# Patient Record
Sex: MALE | Race: WHITE | Employment: OTHER | URBAN - METROPOLITAN AREA
[De-identification: names, ages, dates, MRNs, and addresses within clinical notes are randomized per-mention and may not be internally consistent; named-entity substitution may affect disease eponyms.]

---

## 2018-01-15 PROBLEM — N17.9 ACUTE RENAL FAILURE (HCC): Status: ACTIVE | Noted: 2018-01-15

## 2018-01-23 ENCOUNTER — OFFICE VISIT (OUTPATIENT)
Dept: INTERNAL MEDICINE | Age: 63
End: 2018-01-23
Attending: STUDENT IN AN ORGANIZED HEALTH CARE EDUCATION/TRAINING PROGRAM

## 2018-01-23 VITALS
SYSTOLIC BLOOD PRESSURE: 160 MMHG | HEART RATE: 97 BPM | DIASTOLIC BLOOD PRESSURE: 83 MMHG | BODY MASS INDEX: 24.66 KG/M2 | WEIGHT: 152.8 LBS | OXYGEN SATURATION: 92 % | TEMPERATURE: 92 F | RESPIRATION RATE: 18 BRPM

## 2018-01-23 DIAGNOSIS — Z00.00 ENCOUNTER FOR WELLNESS EXAMINATION: ICD-10-CM

## 2018-01-23 DIAGNOSIS — N13.9 OBSTRUCTIVE UROPATHY: ICD-10-CM

## 2018-01-23 DIAGNOSIS — R03.0 ELEVATED BLOOD PRESSURE READING IN OFFICE WITHOUT DIAGNOSIS OF HYPERTENSION: Primary | ICD-10-CM

## 2018-01-23 LAB
ALBUMIN SERPL-MCNC: 3.6 G/DL (ref 3.4–5)
ALP BLD-CCNC: 66 U/L (ref 40–129)
ALT SERPL-CCNC: 23 U/L (ref 10–40)
ANION GAP SERPL CALCULATED.3IONS-SCNC: 12 MMOL/L (ref 3–16)
AST SERPL-CCNC: 18 U/L (ref 15–37)
BASOPHILS ABSOLUTE: 0.1 K/UL (ref 0–0.2)
BASOPHILS RELATIVE PERCENT: 0.5 %
BILIRUB SERPL-MCNC: 0.4 MG/DL (ref 0–1)
BILIRUBIN DIRECT: <0.2 MG/DL (ref 0–0.3)
BILIRUBIN, INDIRECT: NORMAL MG/DL (ref 0–1)
BUN BLDV-MCNC: 10 MG/DL (ref 7–20)
CALCIUM SERPL-MCNC: 8.9 MG/DL (ref 8.3–10.6)
CHLORIDE BLD-SCNC: 99 MMOL/L (ref 99–110)
CO2: 26 MMOL/L (ref 21–32)
CREAT SERPL-MCNC: 1.1 MG/DL (ref 0.8–1.3)
EOSINOPHILS ABSOLUTE: 0.3 K/UL (ref 0–0.6)
EOSINOPHILS RELATIVE PERCENT: 2.2 %
GFR AFRICAN AMERICAN: >60
GFR NON-AFRICAN AMERICAN: >60
GLUCOSE BLD-MCNC: 84 MG/DL (ref 70–99)
HCT VFR BLD CALC: 41.9 % (ref 40.5–52.5)
HEMOGLOBIN: 14.3 G/DL (ref 13.5–17.5)
HEPATITIS C ANTIBODY INTERPRETATION: NORMAL
LYMPHOCYTES ABSOLUTE: 1.4 K/UL (ref 1–5.1)
LYMPHOCYTES RELATIVE PERCENT: 11.6 %
MAGNESIUM: 1.9 MG/DL (ref 1.8–2.4)
MCH RBC QN AUTO: 31.8 PG (ref 26–34)
MCHC RBC AUTO-ENTMCNC: 34 G/DL (ref 31–36)
MCV RBC AUTO: 93.5 FL (ref 80–100)
MONOCYTES ABSOLUTE: 1.2 K/UL (ref 0–1.3)
MONOCYTES RELATIVE PERCENT: 9.7 %
NEUTROPHILS ABSOLUTE: 9.2 K/UL (ref 1.7–7.7)
NEUTROPHILS RELATIVE PERCENT: 76 %
PDW BLD-RTO: 12.4 % (ref 12.4–15.4)
PHOSPHORUS: 2 MG/DL (ref 2.5–4.9)
PLATELET # BLD: 338 K/UL (ref 135–450)
PMV BLD AUTO: 7.9 FL (ref 5–10.5)
POTASSIUM SERPL-SCNC: 3.6 MMOL/L (ref 3.5–5.1)
RBC # BLD: 4.48 M/UL (ref 4.2–5.9)
SODIUM BLD-SCNC: 137 MMOL/L (ref 136–145)
TOTAL PROTEIN: 7.6 G/DL (ref 6.4–8.2)
TSH SERPL DL<=0.05 MIU/L-ACNC: 18.56 UIU/ML (ref 0.27–4.2)
WBC # BLD: 12.1 K/UL (ref 4–11)

## 2018-01-23 NOTE — PROGRESS NOTES
Outpatient Clinic Visit Note    Patient: Ervin Groves  : 1955 (20 y.o.)  Date: 2018    CC: Follow up visit after discharge    HPI:    Ervin Groves is a 64 yo male with no significant medical history (did not seek medical attention for long time) who presented to [facility] for nausea, vomiting, abdominal pain, diarrhea. Patient did not have any urinary symptoms on admission. Patient was found to have obstructive uropathy with enlarged prostate on CT abdomen/pelvis. BUN/Cr was 133/40 and K was 7.7 on admission and patient underwent emergent dialysis. Patient improved significantly medically and clinically after dialysis. Nephrology and urology was consulted. Patient was started on flomax and butcher was placed. Bone scan was negative for mets. Patient today feels good and wants to establish PCP. Denies headache, nausea, fever, abdominal pain, dysuria, chest pain or SOB. Patient has an appointment with urology for the further work up for enlarged prostate and butcher. He is agreeable with preventive and screening measures today. Past Medical History:    No past medical history on file. Past Surgical History:  No past surgical history on file. Home Medications:  Has been reviewed and as documented. Allergies:    Review of patient's allergies indicates no known allergies. Family History:   No family history on file. ROS: A 10-organ Review Of Systems was obtained and otherwise unremarkable except as per HPI. Data: Old records have been reviewed electronically. PHYSICAL EXAM:  BP (!) 160/83 (Site: Right Arm, Position: Sitting, Cuff Size: Medium Adult)   Pulse 97   Temp 92 °F (33.3 °C) (Oral)   Resp 18   Wt 152 lb 12.8 oz (69.3 kg)   SpO2 92%   BMI 24.66 kg/m²   Physical Exam   Constitutional: He is oriented to person, place, and time. He appears well-developed and well-nourished. HENT:   Head: Normocephalic. Eyes: EOM are normal. Pupils are equal, round, and reactive to light.

## 2018-01-24 LAB
HIV AG/AB: NORMAL
HIV ANTIGEN: NORMAL
HIV-1 ANTIBODY: NORMAL
HIV-2 AB: NORMAL

## 2018-02-07 PROBLEM — R31.9 HEMATURIA: Status: ACTIVE | Noted: 2018-02-07

## 2018-02-09 PROBLEM — N13.8 BENIGN PROSTATIC HYPERPLASIA WITH URINARY OBSTRUCTION: Status: ACTIVE | Noted: 2018-02-09

## 2018-02-09 PROBLEM — N17.9 ACUTE RENAL FAILURE (HCC): Status: RESOLVED | Noted: 2018-01-15 | Resolved: 2018-02-09

## 2018-02-09 PROBLEM — N40.1 BENIGN PROSTATIC HYPERPLASIA WITH URINARY OBSTRUCTION: Status: ACTIVE | Noted: 2018-02-09

## 2018-02-09 PROBLEM — R31.9 HEMATURIA: Status: RESOLVED | Noted: 2018-02-07 | Resolved: 2018-02-09

## 2018-02-20 ENCOUNTER — OFFICE VISIT (OUTPATIENT)
Dept: INTERNAL MEDICINE | Age: 63
End: 2018-02-20
Attending: STUDENT IN AN ORGANIZED HEALTH CARE EDUCATION/TRAINING PROGRAM

## 2018-02-20 VITALS
DIASTOLIC BLOOD PRESSURE: 90 MMHG | HEART RATE: 80 BPM | SYSTOLIC BLOOD PRESSURE: 155 MMHG | TEMPERATURE: 98.6 F | RESPIRATION RATE: 20 BRPM | BODY MASS INDEX: 24.86 KG/M2 | WEIGHT: 154 LBS | OXYGEN SATURATION: 98 %

## 2018-02-20 DIAGNOSIS — Z12.11 SCREEN FOR COLON CANCER: Primary | ICD-10-CM

## 2018-02-20 NOTE — PROGRESS NOTES
Outpatient Clinic Visit Note    Patient: Pola Gardner  : 1955 (59 y.o.)  Date: 2018    CC: Follow up    HPI:    Pola Gardner is a 66 yo male with recent history of UTI and obstructive uropathy s/p TURP who visits the clinic for the follow up. Patient needed temporary emergent dialysis and butcher placement for uropathy then later he underwent TURB with blood clot evacuation on 2018. He had UTI with enterococcus at that time and it was treated with ampicillin and amoxicillin. Patient did well after procedure without complication was discharged 2 days after TURP. Patient has been doing well since. Today he does not complain of any urinary symptoms, fever, chill, or abdominal pain. Patient wants to establish PCP with [address] [place] [address]. He visited this clinic once in January for the follow up visit and he had initial screening works done. Past Medical History:    Past Medical History:   Diagnosis Date    BPH (benign prostatic hyperplasia)        Past Surgical History:  Past Surgical History:   Procedure Laterality Date    TURP  2018    cysto       Home Medications:  Has been reviewed and as documented. Allergies:    Review of patient's allergies indicates no known allergies. Family History:   No family history on file. ROS: A 10-organ Review Of Systems was obtained and otherwise unremarkable except as per HPI. Data: Old records have been reviewed electronically. PHYSICAL EXAM:  BP (!) 155/90   Pulse 80   Temp 98.6 °F (37 °C)   Resp 20   Wt 154 lb (69.9 kg)   SpO2 98%   BMI 24.86 kg/m²   Physical Exam   Constitutional: He is oriented to person, place, and time. He appears well-developed. HENT:   Head: Normocephalic and atraumatic. Eyes: Pupils are equal, round, and reactive to light. Neck: Normal range of motion. No JVD present. Cardiovascular: Normal rate and regular rhythm. No murmur heard.   Pulmonary/Chest: Effort normal and breath sounds normal. No

## 2018-02-20 NOTE — PROGRESS NOTES
Addendum: I was physically present with resident physician, Dr. Edgardo Cordoba on 2/20/2018 for the key aspects of the history taking and the physical examination and performed them myself independently. I directed the management plan as documented in the note by the resident physician with the following additions:    1. Screen for colon cancer  Will update  - Direct Screening Colonoscopy Referral    2. Elevated blood pressure reading in office without diagnosis of hypertension  Likely has HTN, will f/u in 2 weeks, then 1 month if meds need to be started. Will get labs for age appropriate risk stratification, TSH, CMP, CBC     3. Obstructive uropathy  Now with indwelling butcher     Open in clinic and f/u in 3-6 month    Jaimie Tirado M.D.   Internal Medicine Attending  Office: [phone]  2/20/2018, 1:48 PM

## 2018-02-20 NOTE — PROGRESS NOTES
[facility] NOTE      February 20, 2018  Ramon Frank    Chief Complaint:   Chief Complaint   Patient presents with    Follow-Up from Hospital       Constitutional: negative  Eyes: negative  Ears, nose, mouth, throat, and face: negative  Respiratory: negative  Cardiovascular: negative  Gastrointestinal: negative  Genitourinary: negative, BPH  Integument/breast: negative  Hematologic/lymphatic: negative  Musculoskeletal: negative  Neurological: negative  Diabetes: No    Pain Assessment:  Pain Present: no      Mobility/Safety/Self-Care:  Steady Gait: Yes  History of Falls: No   History of a Fall within the last 30 days No  Assistive Device: None  Poor Hygiene: No    Psychosocial:   Depression: No  If YES,    Does Patient express current thoughts of harming self or others?: No  Anxious: No  Insomnia: No  Inappropriate Behavior: No  Alcohol Abuse: no  Substance Abuse: no  Signs of Physical Abuse: No  Signs of Emotional Abuse: No    Educational:  Identify the learner who is being assessed for education:  patient                    Ability to Learn:  Exhibits ability to grasp concepts and respond to questions: Medium  Ready to Learn: Yes  calm   Preferred Method of Learning:  written  Barriers to Learning: Verbalizes interest  Special Considerations due to cultural, Taoism, spiritual beliefs:  No  Language:  English  :  No    Note:   States he has his prostate shaved.       1:10 PM 2/20/2018

## 2021-09-20 ENCOUNTER — APPOINTMENT (OUTPATIENT)
Dept: GENERAL RADIOLOGY | Age: 66
DRG: 871 | End: 2021-09-20

## 2021-09-20 ENCOUNTER — HOSPITAL ENCOUNTER (INPATIENT)
Age: 66
LOS: 9 days | Discharge: HOME HEALTH | DRG: 871 | End: 2021-09-29
Attending: EMERGENCY MEDICINE | Admitting: INTERNAL MEDICINE
Payer: MEDICARE

## 2021-09-20 DIAGNOSIS — U07.1 COVID-19: Primary | ICD-10-CM

## 2021-09-20 PROBLEM — J12.82 PNEUMONIA DUE TO COVID-19 VIRUS: Status: ACTIVE | Noted: 2021-09-20

## 2021-09-20 LAB
ALBUMIN SERPL-MCNC: 3.4 G/DL (ref 3.4–5)
ALP BLD-CCNC: 64 U/L (ref 40–129)
ALT SERPL-CCNC: 19 U/L (ref 10–40)
ANION GAP SERPL CALCULATED.3IONS-SCNC: 16 MMOL/L (ref 3–16)
AST SERPL-CCNC: 57 U/L (ref 15–37)
BASE EXCESS VENOUS: -1.6 MMOL/L (ref -2–3)
BASOPHILS ABSOLUTE: 0 K/UL (ref 0–0.2)
BASOPHILS RELATIVE PERCENT: 0.8 %
BILIRUB SERPL-MCNC: 1 MG/DL (ref 0–1)
BILIRUBIN DIRECT: 0.4 MG/DL (ref 0–0.3)
BILIRUBIN, INDIRECT: 0.6 MG/DL (ref 0–1)
BUN BLDV-MCNC: 20 MG/DL (ref 7–20)
CALCIUM SERPL-MCNC: 8.5 MG/DL (ref 8.3–10.6)
CARBOXYHEMOGLOBIN: 0.8 % (ref 0–1.5)
CHLORIDE BLD-SCNC: 96 MMOL/L (ref 99–110)
CO2: 17 MMOL/L (ref 21–32)
CREAT SERPL-MCNC: 1.2 MG/DL (ref 0.8–1.3)
EKG ATRIAL RATE: 103 BPM
EKG DIAGNOSIS: NORMAL
EKG P AXIS: 30 DEGREES
EKG P-R INTERVAL: 140 MS
EKG Q-T INTERVAL: 332 MS
EKG QRS DURATION: 86 MS
EKG QTC CALCULATION (BAZETT): 434 MS
EKG R AXIS: 23 DEGREES
EKG T AXIS: 9 DEGREES
EKG VENTRICULAR RATE: 103 BPM
EOSINOPHILS ABSOLUTE: 0 K/UL (ref 0–0.6)
EOSINOPHILS RELATIVE PERCENT: 0 %
GFR AFRICAN AMERICAN: >60
GFR NON-AFRICAN AMERICAN: >60
GLUCOSE BLD-MCNC: 101 MG/DL (ref 70–99)
GLUCOSE BLD-MCNC: 166 MG/DL (ref 70–99)
HCO3 VENOUS: 20.8 MMOL/L (ref 24–28)
HCT VFR BLD CALC: 43.7 % (ref 40.5–52.5)
HEMOGLOBIN, VEN, REDUCED: 12.9 %
HEMOGLOBIN: 15.1 G/DL (ref 13.5–17.5)
LIPASE: 111 U/L (ref 13–60)
LYMPHOCYTES ABSOLUTE: 0.5 K/UL (ref 1–5.1)
LYMPHOCYTES RELATIVE PERCENT: 10.7 %
MCH RBC QN AUTO: 32.1 PG (ref 26–34)
MCHC RBC AUTO-ENTMCNC: 34.6 G/DL (ref 31–36)
MCV RBC AUTO: 92.8 FL (ref 80–100)
METHEMOGLOBIN VENOUS: 0 % (ref 0–1.5)
MONOCYTES ABSOLUTE: 0.5 K/UL (ref 0–1.3)
MONOCYTES RELATIVE PERCENT: 10.5 %
NEUTROPHILS ABSOLUTE: 3.5 K/UL (ref 1.7–7.7)
NEUTROPHILS RELATIVE PERCENT: 78 %
O2 SAT, VEN: 87 %
PCO2, VEN: 28.9 MMHG (ref 41–51)
PDW BLD-RTO: 12.9 % (ref 12.4–15.4)
PERFORMED ON: ABNORMAL
PH VENOUS: 7.46 (ref 7.35–7.45)
PLATELET # BLD: 181 K/UL (ref 135–450)
PMV BLD AUTO: 8 FL (ref 5–10.5)
PO2, VEN: 48.3 MMHG (ref 25–40)
POTASSIUM REFLEX MAGNESIUM: 3.7 MMOL/L (ref 3.5–5.1)
RBC # BLD: 4.7 M/UL (ref 4.2–5.9)
SODIUM BLD-SCNC: 129 MMOL/L (ref 136–145)
TCO2 CALC VENOUS: 22 MMOL/L
TOTAL PROTEIN: 7.8 G/DL (ref 6.4–8.2)
WBC # BLD: 4.5 K/UL (ref 4–11)

## 2021-09-20 PROCEDURE — 82570 ASSAY OF URINE CREATININE: CPT

## 2021-09-20 PROCEDURE — 6370000000 HC RX 637 (ALT 250 FOR IP): Performed by: INTERNAL MEDICINE

## 2021-09-20 PROCEDURE — 82803 BLOOD GASES ANY COMBINATION: CPT

## 2021-09-20 PROCEDURE — U0005 INFEC AGEN DETEC AMPLI PROBE: HCPCS

## 2021-09-20 PROCEDURE — 99285 EMERGENCY DEPT VISIT HI MDM: CPT

## 2021-09-20 PROCEDURE — 80048 BASIC METABOLIC PNL TOTAL CA: CPT

## 2021-09-20 PROCEDURE — 84439 ASSAY OF FREE THYROXINE: CPT

## 2021-09-20 PROCEDURE — 2060000000 HC ICU INTERMEDIATE R&B

## 2021-09-20 PROCEDURE — 6360000002 HC RX W HCPCS: Performed by: INTERNAL MEDICINE

## 2021-09-20 PROCEDURE — 93005 ELECTROCARDIOGRAM TRACING: CPT | Performed by: EMERGENCY MEDICINE

## 2021-09-20 PROCEDURE — 71045 X-RAY EXAM CHEST 1 VIEW: CPT

## 2021-09-20 PROCEDURE — U0003 INFECTIOUS AGENT DETECTION BY NUCLEIC ACID (DNA OR RNA); SEVERE ACUTE RESPIRATORY SYNDROME CORONAVIRUS 2 (SARS-COV-2) (CORONAVIRUS DISEASE [COVID-19]), AMPLIFIED PROBE TECHNIQUE, MAKING USE OF HIGH THROUGHPUT TECHNOLOGIES AS DESCRIBED BY CMS-2020-01-R: HCPCS

## 2021-09-20 PROCEDURE — 80076 HEPATIC FUNCTION PANEL: CPT

## 2021-09-20 PROCEDURE — 85025 COMPLETE CBC W/AUTO DIFF WBC: CPT

## 2021-09-20 PROCEDURE — 85379 FIBRIN DEGRADATION QUANT: CPT

## 2021-09-20 PROCEDURE — 2580000003 HC RX 258: Performed by: INTERNAL MEDICINE

## 2021-09-20 PROCEDURE — XW033E5 INTRODUCTION OF REMDESIVIR ANTI-INFECTIVE INTO PERIPHERAL VEIN, PERCUTANEOUS APPROACH, NEW TECHNOLOGY GROUP 5: ICD-10-PCS | Performed by: INTERNAL MEDICINE

## 2021-09-20 PROCEDURE — 36415 COLL VENOUS BLD VENIPUNCTURE: CPT

## 2021-09-20 PROCEDURE — 83690 ASSAY OF LIPASE: CPT

## 2021-09-20 PROCEDURE — 86140 C-REACTIVE PROTEIN: CPT

## 2021-09-20 PROCEDURE — 87449 NOS EACH ORGANISM AG IA: CPT

## 2021-09-20 PROCEDURE — 6360000002 HC RX W HCPCS: Performed by: STUDENT IN AN ORGANIZED HEALTH CARE EDUCATION/TRAINING PROGRAM

## 2021-09-20 PROCEDURE — 2500000003 HC RX 250 WO HCPCS: Performed by: INTERNAL MEDICINE

## 2021-09-20 PROCEDURE — 84443 ASSAY THYROID STIM HORMONE: CPT

## 2021-09-20 PROCEDURE — 84145 PROCALCITONIN (PCT): CPT

## 2021-09-20 RX ORDER — LANOLIN ALCOHOL/MO/W.PET/CERES
100 CREAM (GRAM) TOPICAL DAILY
Status: DISCONTINUED | OUTPATIENT
Start: 2021-09-20 | End: 2021-09-20 | Stop reason: CLARIF

## 2021-09-20 RX ORDER — 0.9 % SODIUM CHLORIDE 0.9 %
30 INTRAVENOUS SOLUTION INTRAVENOUS PRN
Status: DISCONTINUED | OUTPATIENT
Start: 2021-09-20 | End: 2021-09-29 | Stop reason: HOSPADM

## 2021-09-20 RX ORDER — DEXAMETHASONE 4 MG/1
6 TABLET ORAL DAILY
Status: DISCONTINUED | OUTPATIENT
Start: 2021-09-20 | End: 2021-09-20

## 2021-09-20 RX ORDER — VITAMIN B COMPLEX
2000 TABLET ORAL DAILY
Status: DISCONTINUED | OUTPATIENT
Start: 2021-09-20 | End: 2021-09-29 | Stop reason: HOSPADM

## 2021-09-20 RX ORDER — SODIUM CHLORIDE 9 MG/ML
25 INJECTION, SOLUTION INTRAVENOUS PRN
Status: DISCONTINUED | OUTPATIENT
Start: 2021-09-20 | End: 2021-09-29 | Stop reason: HOSPADM

## 2021-09-20 RX ORDER — SODIUM CHLORIDE 0.9 % (FLUSH) 0.9 %
5-40 SYRINGE (ML) INJECTION PRN
Status: DISCONTINUED | OUTPATIENT
Start: 2021-09-20 | End: 2021-09-29 | Stop reason: HOSPADM

## 2021-09-20 RX ORDER — PANTOPRAZOLE SODIUM 40 MG/1
40 TABLET, DELAYED RELEASE ORAL
Status: DISCONTINUED | OUTPATIENT
Start: 2021-09-20 | End: 2021-09-29 | Stop reason: HOSPADM

## 2021-09-20 RX ORDER — DEXAMETHASONE 4 MG/1
6 TABLET ORAL ONCE
Status: COMPLETED | OUTPATIENT
Start: 2021-09-20 | End: 2021-09-20

## 2021-09-20 RX ORDER — GUAIFENESIN/DEXTROMETHORPHAN 100-10MG/5
5 SYRUP ORAL 4 TIMES DAILY
Status: DISCONTINUED | OUTPATIENT
Start: 2021-09-20 | End: 2021-09-29 | Stop reason: HOSPADM

## 2021-09-20 RX ORDER — ACETAMINOPHEN 325 MG/1
650 TABLET ORAL EVERY 6 HOURS PRN
Status: DISCONTINUED | OUTPATIENT
Start: 2021-09-20 | End: 2021-09-29 | Stop reason: HOSPADM

## 2021-09-20 RX ORDER — SODIUM CHLORIDE 0.9 % (FLUSH) 0.9 %
5-40 SYRINGE (ML) INJECTION EVERY 12 HOURS SCHEDULED
Status: DISCONTINUED | OUTPATIENT
Start: 2021-09-20 | End: 2021-09-29 | Stop reason: HOSPADM

## 2021-09-20 RX ORDER — DEXTROSE MONOHYDRATE 25 G/50ML
12.5 INJECTION, SOLUTION INTRAVENOUS PRN
Status: DISCONTINUED | OUTPATIENT
Start: 2021-09-20 | End: 2021-09-29 | Stop reason: HOSPADM

## 2021-09-20 RX ORDER — ACETAMINOPHEN 650 MG/1
650 SUPPOSITORY RECTAL EVERY 6 HOURS PRN
Status: DISCONTINUED | OUTPATIENT
Start: 2021-09-20 | End: 2021-09-29 | Stop reason: HOSPADM

## 2021-09-20 RX ORDER — MULTIVIT-MIN/FERROUS GLUCONATE 9 MG/15 ML
15 LIQUID (ML) ORAL DAILY
Status: DISCONTINUED | OUTPATIENT
Start: 2021-09-20 | End: 2021-09-29 | Stop reason: HOSPADM

## 2021-09-20 RX ORDER — POLYETHYLENE GLYCOL 3350 17 G/17G
17 POWDER, FOR SOLUTION ORAL DAILY PRN
Status: DISCONTINUED | OUTPATIENT
Start: 2021-09-20 | End: 2021-09-29 | Stop reason: HOSPADM

## 2021-09-20 RX ORDER — INSULIN LISPRO 100 [IU]/ML
0-6 INJECTION, SOLUTION INTRAVENOUS; SUBCUTANEOUS
Status: DISCONTINUED | OUTPATIENT
Start: 2021-09-21 | End: 2021-09-29 | Stop reason: HOSPADM

## 2021-09-20 RX ORDER — SODIUM CHLORIDE 9 MG/ML
INJECTION, SOLUTION INTRAVENOUS CONTINUOUS
Status: ACTIVE | OUTPATIENT
Start: 2021-09-20 | End: 2021-09-20

## 2021-09-20 RX ORDER — GAUZE BANDAGE 2" X 2"
100 BANDAGE TOPICAL DAILY
Status: DISCONTINUED | OUTPATIENT
Start: 2021-09-20 | End: 2021-09-29 | Stop reason: HOSPADM

## 2021-09-20 RX ORDER — INSULIN LISPRO 100 [IU]/ML
0-3 INJECTION, SOLUTION INTRAVENOUS; SUBCUTANEOUS NIGHTLY
Status: DISCONTINUED | OUTPATIENT
Start: 2021-09-20 | End: 2021-09-29 | Stop reason: HOSPADM

## 2021-09-20 RX ORDER — NICOTINE POLACRILEX 4 MG
15 LOZENGE BUCCAL PRN
Status: DISCONTINUED | OUTPATIENT
Start: 2021-09-20 | End: 2021-09-29 | Stop reason: HOSPADM

## 2021-09-20 RX ORDER — DEXTROSE MONOHYDRATE 50 MG/ML
100 INJECTION, SOLUTION INTRAVENOUS PRN
Status: DISCONTINUED | OUTPATIENT
Start: 2021-09-20 | End: 2021-09-29 | Stop reason: HOSPADM

## 2021-09-20 RX ORDER — ONDANSETRON 2 MG/ML
4 INJECTION INTRAMUSCULAR; INTRAVENOUS EVERY 6 HOURS PRN
Status: DISCONTINUED | OUTPATIENT
Start: 2021-09-20 | End: 2021-09-29 | Stop reason: HOSPADM

## 2021-09-20 RX ORDER — ONDANSETRON 4 MG/1
4 TABLET, ORALLY DISINTEGRATING ORAL EVERY 8 HOURS PRN
Status: DISCONTINUED | OUTPATIENT
Start: 2021-09-20 | End: 2021-09-29 | Stop reason: HOSPADM

## 2021-09-20 RX ADMIN — SODIUM CHLORIDE: 9 INJECTION, SOLUTION INTRAVENOUS at 15:06

## 2021-09-20 RX ADMIN — REMDESIVIR 200 MG: 100 INJECTION, POWDER, LYOPHILIZED, FOR SOLUTION INTRAVENOUS at 21:04

## 2021-09-20 RX ADMIN — Medication 100 MG: at 16:51

## 2021-09-20 RX ADMIN — INSULIN LISPRO 1 UNITS: 100 INJECTION, SOLUTION INTRAVENOUS; SUBCUTANEOUS at 21:50

## 2021-09-20 RX ADMIN — Medication 15 ML: at 16:31

## 2021-09-20 RX ADMIN — Medication 2000 UNITS: at 15:05

## 2021-09-20 RX ADMIN — ENOXAPARIN SODIUM 30 MG: 30 INJECTION SUBCUTANEOUS at 14:40

## 2021-09-20 RX ADMIN — DEXAMETHASONE 6 MG: 4 TABLET ORAL at 14:38

## 2021-09-20 RX ADMIN — ENOXAPARIN SODIUM 30 MG: 30 INJECTION SUBCUTANEOUS at 21:00

## 2021-09-20 RX ADMIN — DEXAMETHASONE SODIUM PHOSPHATE 20 MG: 4 INJECTION, SOLUTION INTRAMUSCULAR; INTRAVENOUS at 21:49

## 2021-09-20 RX ADMIN — GUAIFENESIN SYRUP AND DEXTROMETHORPHAN 5 ML: 100; 10 SYRUP ORAL at 15:05

## 2021-09-20 RX ADMIN — GUAIFENESIN SYRUP AND DEXTROMETHORPHAN 5 ML: 100; 10 SYRUP ORAL at 20:59

## 2021-09-20 RX ADMIN — DEXAMETHASONE 6 MG: 4 TABLET ORAL at 12:36

## 2021-09-20 ASSESSMENT — PAIN SCALES - GENERAL
PAINLEVEL_OUTOF10: 0

## 2021-09-20 NOTE — PROGRESS NOTES
4 Eyes Admission Assessment     I agree as the admission nurse that 2 RN's have performed a thorough Head to Toe Skin Assessment on the patient. ALL assessment sites listed below have been assessed on admission. Areas assessed by both nurses:   [x]   Head, Face, and Ears   [x]   Shoulders, Back, and Chest  [x]   Arms, Elbows, and Hands   [x]   Coccyx, Sacrum, and Ischium  [x]   Legs, Feet, and Heels        Does the Patient have Skin Breakdown?   No         Ry Prevention initiated:  No   Wound Care Orders initiated:  No      [facility] nurse consulted for Pressure Injury (Stage 3,4, Unstageable, DTI, NWPT, and Complex wounds) or Ry score 18 or lower:  No      Nurse 1 eSignature: Electronically signed by Chitra Barnes RN on 9/20/21 at 7:43 PM EDT    **SHARE this note so that the co-signing nurse is able to place an eSignature**    Nurse 2 eSignature: Electronically signed by Quinn Argueta RN on 9/20/21 at 8:19 PM EDT

## 2021-09-20 NOTE — PROGRESS NOTES
Clinical Pharmacy Progress Note  Medication History     Admit Date: 9/20/21    List of of current medications patient is taking is complete. Home Medication list in EPIC updated to reflect changes noted below. Source of information: Patient    Changes made to medication list:   Medications added:   · Daily multivitamin    Other notes:   · Confirmed by patient -- no other home medications       Complete Home Medication List:  Medication Sig    Multiple Vitamin (MULTIVITAMIN ADULT PO) Take 1 tablet by mouth daily     Please call with questions!     [address] [facility] [address]  9/20/2021 4:51 PM

## 2021-09-20 NOTE — CONSULTS
Remdesivir Initiation Note    This patient meets criteria for initiation of remdesivir based on the following:  · Proven COVID-19  · Moderate disease (Requiring supplemental O2)  · Acceptable hepatic function (ALT within 5 times ULN)    Exclusion Criteria:   Severe disease requiring invasive or non-invasive mechanical ventilation (includes HFNC & BiPAP)   Could consider use in patients requiring high flow if early on in the disease course (based on symptom duration)   Use of more than 1 vasopressor prior to remdesivir initiation   Already improving on supportive treatment and/or impending discharge   Patients in whom the clinical team think death is in the immediate short-term where remdesivir is unlikely to change the clinical outcome     Liver function tests will be monitored daily while on remdesivir. Please call with any questions.   Arun Black, PharmD, BCPS  Main pharmacy: [id number]  9/20/2021 5:51 PM

## 2021-09-20 NOTE — ED TRIAGE NOTES
Pt reports testing positive for covid on Saturday and having poor po intake since, no sob but decreased oxygen on ra. Improved with supplemental oxygen via nasal cannula.  Pt alert and oriented and denies pain at this time

## 2021-09-20 NOTE — PROGRESS NOTES
Pt arrived to unit at this time. Pt alert and oriented, able to make needs known. Pt free from distress.

## 2021-09-20 NOTE — ED PROVIDER NOTES
ED Attending Attestation Note     Date of evaluation: 9/20/2021    This patient was seen by the resident. I have seen and examined the patient, agree with the workup, evaluation, management and diagnosis. The care plan has been discussed. I have seen the EKG and agree with resident interpretation. My assessment reveals a 71-year-old male who presents with chief complaint of fatigue. .  Patient has known Covid, found to be hypoxic on room air to 84%. Placed on oxygen. Increased work of breathing on exam.  Otherwise nontoxic in appearance.        Carlyn Martin MD  09/20/21 [id number]

## 2021-09-20 NOTE — ED PROVIDER NOTES
[facility] RESIDENT NOTE       Date of evaluation: 9/20/2021    Chief Complaint     Fatigue (pt reports testing covid + at urgent care on saturday, poor po intake since, ra oxygen mid 80s )      MAURIZIO Cruz is a 60 y.o. male with PMHx of BPH who presents after testing positive for Covid on 9/18/2021 at urgent care. Patient has had symptoms since approximately 1 week ago. He has had significant fatigue and loss of appetite. Now has worsening shortness of breath. Some coughing but this is a less prominent feature of his illness. Denies objective fevers. Patient denies prior pulmonary diagnoses or baseline oxygen requirement. With the exception of above, the patient denies any aggravating or alleviating factors as well as any other associated signs or symptoms. Review of Systems     Pertinent positive and negative findings as documented in the HPI otherwise all other systems were reviewed and were negative. Past Medical, Surgical, Family, and Social History     He has a past medical history of BPH (benign prostatic hyperplasia). He has a past surgical history that includes TURP (02/07/2018). His family history is not on file. He reports that he has never smoked. He has never used smokeless tobacco. He reports that he does not drink alcohol and does not use drugs. Medications     Current Discharge Medication List      CONTINUE these medications which have NOT CHANGED    Details   Multiple Vitamin (MULTIVITAMIN ADULT PO) Take 1 tablet by mouth daily             Allergies     He has No Known Allergies. Physical Exam     INITIAL VITALS: BP: (!) 118/91, Temp: 98.3 °F (36.8 °C), Pulse: 106, Resp: 20, SpO2: (!) 84 %     General:   No acute distress. Nontoxic appearing. Eyes:  Pupils reactive. EOMI  Neck:  Supple, trachea midline. Pulmonary:   Non-labored breathing. Breath sounds coarse  Cardiac:  Regular rate and rhythm.    Abdomen: Soft. Non-tender. Non-distended. No guarding or rebound. Musculoskeletal:  No long bone deformity. Extremities:  No peripheral edema. LE symmetric in size. Neuro:  Alert. Moves all four extremities to command. No focal deficit      Diagnostic Results     EKG Interpretation    Interpreted by emergency department physician    Rhythm: sinus tachycardia  Rate: tachycardia  Axis: normal  Ectopy: none  Conduction: normal  ST Segments: normal  T Waves: normal  Q Waves: none    Clinical Impression: sinus tachycardia    Jovita Walters MD    RADIOLOGY:  XR CHEST PORTABLE   Final Result      Coarse bilateral basilar predominant consolidation is compatible with atypical/viral pneumonia in the proper clinical context. Elevated right hemidiaphragm. Normal cardiomediastinal silhouette.       CTA PULMONARY W CONTRAST    (Results Pending)   VL Extremity Venous Bilateral    (Results Pending)       LABS:   Results for orders placed or performed during the hospital encounter of 09/20/21   CBC Auto Differential   Result Value Ref Range    WBC 4.5 4.0 - 11.0 K/uL    RBC 4.70 4.20 - 5.90 M/uL    Hemoglobin 15.1 13.5 - 17.5 g/dL    Hematocrit 43.7 40.5 - 52.5 %    MCV 92.8 80.0 - 100.0 fL    MCH 32.1 26.0 - 34.0 pg    MCHC 34.6 31.0 - 36.0 g/dL    RDW 12.9 12.4 - 15.4 %    Platelets 281 464 - 304 K/uL    MPV 8.0 5.0 - 10.5 fL    Neutrophils % 78.0 %    Lymphocytes % 10.7 %    Monocytes % 10.5 %    Eosinophils % 0.0 %    Basophils % 0.8 %    Neutrophils Absolute 3.5 1.7 - 7.7 K/uL    Lymphocytes Absolute 0.5 (L) 1.0 - 5.1 K/uL    Monocytes Absolute 0.5 0.0 - 1.3 K/uL    Eosinophils Absolute 0.0 0.0 - 0.6 K/uL    Basophils Absolute 0.0 0.0 - 0.2 K/uL   Basic Metabolic Panel w/ Reflex to MG   Result Value Ref Range    Sodium 129 (L) 136 - 145 mmol/L    Potassium reflex Magnesium 3.7 3.5 - 5.1 mmol/L    Chloride 96 (L) 99 - 110 mmol/L    CO2 17 (L) 21 - 32 mmol/L    Anion Gap 16 3 - 16    Glucose 101 (H) 70 - 99 mg/dL    BUN 20 7 - 20 mg/dL    CREATININE 1.2 0.8 - 1.3 mg/dL    GFR Non-African American >60 >60    GFR African American >60 >60    Calcium 8.5 8.3 - 10.6 mg/dL   Hepatic Function Panel   Result Value Ref Range    Total Protein 7.8 6.4 - 8.2 g/dL    Albumin 3.4 3.4 - 5.0 g/dL    Alkaline Phosphatase 64 40 - 129 U/L    ALT 19 10 - 40 U/L    AST 57 (H) 15 - 37 U/L    Total Bilirubin 1.0 0.0 - 1.0 mg/dL    Bilirubin, Direct 0.4 (H) 0.0 - 0.3 mg/dL    Bilirubin, Indirect 0.6 0.0 - 1.0 mg/dL   Lipase   Result Value Ref Range    Lipase 111.0 (H) 13.0 - 60.0 U/L   Blood Gas, Venous   Result Value Ref Range    pH, Jacky 7.465 (H) 7.350 - 7.450    pCO2, Jacky 28.9 (L) 41.0 - 51.0 mmHg    pO2, Jacky 48.3 (H) 25 - 40 mmHg    HCO3, Venous 20.8 (L) 24.0 - 28.0 mmol/L    Base Excess, Jacky -1.6 -2.0 - 3.0 mmol/L    O2 Sat, Jacky 87 Not established %    Carboxyhemoglobin 0.8 0.0 - 1.5 %    MetHgb, Jacky 0.0 0.0 - 1.5 %    TC02 (Calc), Jacky 22 mmol/L    Hemoglobin, Jacky, Reduced 12.90 %   Creatinine, urine, random   Result Value Ref Range    Creatinine, Ur 225.0 39.0 - 259.0 mg/dL   Basic metabolic panel   Result Value Ref Range    Sodium 131 (L) 136 - 145 mmol/L    Potassium 3.7 3.5 - 5.1 mmol/L    Chloride 98 (L) 99 - 110 mmol/L    CO2 18 (L) 21 - 32 mmol/L    Anion Gap 15 3 - 16    Glucose 198 (H) 70 - 99 mg/dL    BUN 22 (H) 7 - 20 mg/dL    CREATININE 1.1 0.8 - 1.3 mg/dL    GFR Non-African American >60 >60    GFR African American >60 >60    Calcium 8.2 (L) 8.3 - 10.6 mg/dL   Procalcitonin   Result Value Ref Range    Procalcitonin 0.08 0.00 - 0.15 ng/mL   C-Reactive Protein   Result Value Ref Range    CRP 44.0 (H) 0.0 - 5.1 mg/L   D-Dimer, Quantitative   Result Value Ref Range    D-Dimer, Quant 1080 (H) 0 - 229 ng/mL DDU   Hepatic function panel   Result Value Ref Range    Total Protein 7.3 6.4 - 8.2 g/dL    Albumin 3.1 (L) 3.4 - 5.0 g/dL    Alkaline Phosphatase 63 40 - 129 U/L    ALT 20 10 - 40 U/L    AST 49 (H) 15 - 37 U/L    Total Bilirubin 0.8 0.0 - 1.0 followingmedications:  Orders Placed This Encounter   Medications    sodium chloride flush 0.9 % injection 5-40 mL    sodium chloride flush 0.9 % injection 5-40 mL    0.9 % sodium chloride infusion    enoxaparin (LOVENOX) injection 30 mg    OR Linked Order Group     ondansetron (ZOFRAN-ODT) disintegrating tablet 4 mg     ondansetron (ZOFRAN) injection 4 mg    polyethylene glycol (GLYCOLAX) packet 17 g    OR Linked Order Group     acetaminophen (TYLENOL) tablet 650 mg     acetaminophen (TYLENOL) suppository 650 mg    0.9 % sodium chloride infusion    guaiFENesin-dextromethorphan (ROBITUSSIN DM) 100-10 MG/5ML syrup 5 mL    DISCONTD: dexamethasone (DECADRON) tablet 6 mg    Vitamin D (CHOLECALCIFEROL) tablet 2,000 Units    CENTRUM/CERTA-ISSA with minerals oral solution 15 mL    DISCONTD: thiamine tablet 100 mg    dexamethasone (DECADRON) tablet 6 mg    thiamine mononitrate tablet 100 mg    dexamethasone (DECADRON) 20 mg in sodium chloride 0.9 % IVPB    FOLLOWED BY Linked Order Group     remdesivir 200 mg in sodium chloride 0.9 % 250 mL IVPB      Order Specific Question:   Antimicrobial Indications      Answer: Other      Order Specific Question:   Other Abx Indication      Answer:   covid     remdesivir 100 mg in sodium chloride 0.9 % 250 mL IVPB      Order Specific Question:   Antimicrobial Indications      Answer:    Other      Order Specific Question:   Other Abx Indication      Answer:   covid    0.9 % sodium chloride bolus    insulin lispro (1 Unit Dial) 0-6 Units    insulin lispro (1 Unit Dial) 0-3 Units    pantoprazole (PROTONIX) tablet 40 mg    glucose (GLUTOSE) 40 % oral gel 15 g    dextrose 50 % IV solution    glucagon (rDNA) injection 1 mg    dextrose 5 % solution    iopamidol (ISOVUE-370) 76 % injection 80 mL       CONSULTS:  IP CONSULT TO HOSPITALIST  IP CONSULT TO [address] [place] [address] / ASSESSMENT / Raymond Garsia is a 60 y.o. male with a history and presentation as described above in HPI. The patient was evaluated by myself and the ED Attending Physician, Dr. Latasha Benitez. All management and disposition plans were discussed and agreed upon. Upon presentation, the patient was mildly tachycardic and hypoxic at 84% on room air. Patient was placed on 3L nasal cannula with improvement in oxygen saturation to the mid 90s. He is able to speak in full sentences and has nonlabored respirations. A full history and physical examination was performed. Laboratory studies included basic metabolic panel with mild hyponatremia and metabolic acidosis. VBG with respiratory acidosis. CBC with leukopenia otherwise within normal limits. Mild AST elevation on liver function testing. Chest XR obtained with bilateral consolidation c/w viral PNA in setting of COVID+ test.    Given new oxygen requirement will provided patient with dexamethasone and admit to the hospital. He looks comfortable overall with stable O2 requirements in the ED, feel that floor status is appropriate at this time. At this time the patient has been admitted to medicine for further evaluation and management of acute hypoxic respiratory failure. The patient will continue to be monitored here in the emergency department until which time he is moved to his new treatment location. This patient was also evaluated by the attending physician. All care plans werediscussed and agreed upon.     Clinical Impression     1. COVID-19        Disposition     Admit        Jack Francois MD  Resident  09/21/21 [id number]

## 2021-09-20 NOTE — H&P
cooperative. HEENT Normal cephalic, atraumatic without obvious deformity. Conjunctivae/corneas clear. Neck: Supple, No jugular venous distention/bruits. Trachea midline without thyromegaly or adenopathy with full range of motion. Lungs: diffuse bileratel rales  Heart: Regular rate and rhythm with Normal S1/S2 without murmurs, rubs or gallops, point of maximum impulse non-displaced  Abdomen: Soft, non-tender or non-distended without rigidity or guarding and positive bowel sounds all four quadrants. Extremities: No clubbing, cyanosis, or edema bilaterally. Skin: Skin color, texture, turgor normal.  No rashes or lesions. Neurologic: Alert and oriented X 3,  grossly non-focal.  Mental status: Alert, oriented, thought content appropriate. Capillary refill is brisk  Peripheral pulses 2+    CXR:  I have reviewed the CXR with the following interpretation: multifocal pneumonia  EKG:  I have reviewed the EKG with the following interpretation: sinus with no acute ST TW changes    CBC   Recent Labs     09/20/21  1020   WBC 4.5   HGB 15.1   HCT 43.7         RENAL  Recent Labs     09/20/21  1020   *   K 3.7   CL 96*   CO2 17*   BUN 20   CREATININE 1.2     LFT'S  Recent Labs     09/20/21  1020   AST 57*   ALT 19   BILIDIR 0.4*   BILITOT 1.0   ALKPHOS 64     COAG  No results for input(s): INR in the last 72 hours. CARDIAC ENZYMES  No results for input(s): CKTOTAL, CKMB, CKMBINDEX, TROPONINI in the last 72 hours.     U/A:    Lab Results   Component Value Date    COLORU Not Entered 02/06/2018    WBCUA see below 02/06/2018    RBCUA >100 02/06/2018    BACTERIA 2+ 01/15/2018    CLARITYU Not Entered 02/06/2018    SPECGRAV <=1.005 02/06/2018    LEUKOCYTESUR LARGE 02/06/2018    BLOODU LARGE 02/06/2018    GLUCOSEU Negative 02/06/2018       ABG  No results found for: LVN3VNF, BEART, C3GNZHHY, PHART, THGBART, LBV3MIN, PO2ART, ZRA9FCW        Active Hospital Problems    Diagnosis Date Noted    Pneumonia due to COVID-19 virus [U07.1, J12.82] 09/20/2021         ASSESSMENT/PLAN:    COVID 19 pneumonia with hypoxia:  Admit for close monitoring  Obtain procal, legionella, strep AG, CRP, Ddimer  If Ddimer elevated- will get CTPA    Start decadron and consult pharmacy as patient in his first week of symptoms and may benefit from remdesivir    BPH:  Monitor for urinary retention    Hyponatremia:  Likely due to poor intake. Obtain urine osm, urine lytes and start on saline    DVT Prophylaxis: lovenox  Diet: ADULT DIET; Regular  Code Status: Full Code  PT/OT Eval Status: at baseline    Dispo - Bennie Vo MD    Thank you No primary care provider on file. for the opportunity to be involved in this patient's care. If you have any questions or concerns please feel free to contact me at [phone].

## 2021-09-21 ENCOUNTER — APPOINTMENT (OUTPATIENT)
Dept: CT IMAGING | Age: 66
DRG: 871 | End: 2021-09-21

## 2021-09-21 ENCOUNTER — APPOINTMENT (OUTPATIENT)
Dept: VASCULAR LAB | Age: 66
DRG: 871 | End: 2021-09-21

## 2021-09-21 LAB
ALBUMIN SERPL-MCNC: 3.1 G/DL (ref 3.4–5)
ALP BLD-CCNC: 63 U/L (ref 40–129)
ALT SERPL-CCNC: 20 U/L (ref 10–40)
ANION GAP SERPL CALCULATED.3IONS-SCNC: 15 MMOL/L (ref 3–16)
AST SERPL-CCNC: 49 U/L (ref 15–37)
BASOPHILS ABSOLUTE: 0 K/UL (ref 0–0.2)
BASOPHILS RELATIVE PERCENT: 0.4 %
BILIRUB SERPL-MCNC: 0.8 MG/DL (ref 0–1)
BILIRUBIN DIRECT: 0.3 MG/DL (ref 0–0.3)
BILIRUBIN, INDIRECT: 0.5 MG/DL (ref 0–1)
BUN BLDV-MCNC: 22 MG/DL (ref 7–20)
C-REACTIVE PROTEIN: 43 MG/L (ref 0–5.1)
C-REACTIVE PROTEIN: 44 MG/L (ref 0–5.1)
CALCIUM SERPL-MCNC: 8.2 MG/DL (ref 8.3–10.6)
CHLORIDE BLD-SCNC: 98 MMOL/L (ref 99–110)
CO2: 18 MMOL/L (ref 21–32)
CREAT SERPL-MCNC: 1.1 MG/DL (ref 0.8–1.3)
CREATININE URINE: 225 MG/DL (ref 39–259)
D DIMER: 1080 NG/ML DDU (ref 0–229)
D DIMER: 5150 NG/ML DDU (ref 0–229)
EOSINOPHILS ABSOLUTE: 0 K/UL (ref 0–0.6)
EOSINOPHILS RELATIVE PERCENT: 0 %
GFR AFRICAN AMERICAN: >60
GFR NON-AFRICAN AMERICAN: >60
GLUCOSE BLD-MCNC: 157 MG/DL (ref 70–99)
GLUCOSE BLD-MCNC: 158 MG/DL (ref 70–99)
GLUCOSE BLD-MCNC: 172 MG/DL (ref 70–99)
GLUCOSE BLD-MCNC: 172 MG/DL (ref 70–99)
GLUCOSE BLD-MCNC: 198 MG/DL (ref 70–99)
HCT VFR BLD CALC: 44.4 % (ref 40.5–52.5)
HEMOGLOBIN: 15.2 G/DL (ref 13.5–17.5)
L. PNEUMOPHILA SEROGP 1 UR AG: NORMAL
LACTIC ACID: 1.8 MMOL/L (ref 0.4–2)
LYMPHOCYTES ABSOLUTE: 0.4 K/UL (ref 1–5.1)
LYMPHOCYTES RELATIVE PERCENT: 13.8 %
MCH RBC QN AUTO: 31.8 PG (ref 26–34)
MCHC RBC AUTO-ENTMCNC: 34.3 G/DL (ref 31–36)
MCV RBC AUTO: 92.7 FL (ref 80–100)
MONOCYTES ABSOLUTE: 0.2 K/UL (ref 0–1.3)
MONOCYTES RELATIVE PERCENT: 7.3 %
NEUTROPHILS ABSOLUTE: 2.3 K/UL (ref 1.7–7.7)
NEUTROPHILS RELATIVE PERCENT: 78.5 %
PDW BLD-RTO: 13 % (ref 12.4–15.4)
PERFORMED ON: ABNORMAL
PLATELET # BLD: 192 K/UL (ref 135–450)
PMV BLD AUTO: 8.1 FL (ref 5–10.5)
POTASSIUM SERPL-SCNC: 3.7 MMOL/L (ref 3.5–5.1)
PROCALCITONIN: 0.08 NG/ML (ref 0–0.15)
RBC # BLD: 4.78 M/UL (ref 4.2–5.9)
SARS-COV-2: DETECTED
SODIUM BLD-SCNC: 131 MMOL/L (ref 136–145)
STREP PNEUMONIAE ANTIGEN, URINE: NORMAL
T4 FREE: 1.2 NG/DL (ref 0.9–1.8)
TOTAL PROTEIN: 7.3 G/DL (ref 6.4–8.2)
TSH REFLEX: 4.27 UIU/ML (ref 0.27–4.2)
WBC # BLD: 3 K/UL (ref 4–11)

## 2021-09-21 PROCEDURE — 36415 COLL VENOUS BLD VENIPUNCTURE: CPT

## 2021-09-21 PROCEDURE — 71275 CT ANGIOGRAPHY CHEST: CPT

## 2021-09-21 PROCEDURE — 94761 N-INVAS EAR/PLS OXIMETRY MLT: CPT

## 2021-09-21 PROCEDURE — 2500000003 HC RX 250 WO HCPCS: Performed by: INTERNAL MEDICINE

## 2021-09-21 PROCEDURE — 2700000000 HC OXYGEN THERAPY PER DAY

## 2021-09-21 PROCEDURE — 93970 EXTREMITY STUDY: CPT

## 2021-09-21 PROCEDURE — 6360000002 HC RX W HCPCS: Performed by: INTERNAL MEDICINE

## 2021-09-21 PROCEDURE — 6360000004 HC RX CONTRAST MEDICATION: Performed by: INTERNAL MEDICINE

## 2021-09-21 PROCEDURE — 80076 HEPATIC FUNCTION PANEL: CPT

## 2021-09-21 PROCEDURE — 83605 ASSAY OF LACTIC ACID: CPT

## 2021-09-21 PROCEDURE — 85379 FIBRIN DEGRADATION QUANT: CPT

## 2021-09-21 PROCEDURE — 85025 COMPLETE CBC W/AUTO DIFF WBC: CPT

## 2021-09-21 PROCEDURE — 6370000000 HC RX 637 (ALT 250 FOR IP): Performed by: INTERNAL MEDICINE

## 2021-09-21 PROCEDURE — 2060000000 HC ICU INTERMEDIATE R&B

## 2021-09-21 PROCEDURE — 2580000003 HC RX 258: Performed by: INTERNAL MEDICINE

## 2021-09-21 PROCEDURE — 86140 C-REACTIVE PROTEIN: CPT

## 2021-09-21 RX ADMIN — Medication 100 MG: at 07:56

## 2021-09-21 RX ADMIN — DEXAMETHASONE SODIUM PHOSPHATE 20 MG: 4 INJECTION, SOLUTION INTRAMUSCULAR; INTRAVENOUS at 22:22

## 2021-09-21 RX ADMIN — GUAIFENESIN SYRUP AND DEXTROMETHORPHAN 5 ML: 100; 10 SYRUP ORAL at 22:10

## 2021-09-21 RX ADMIN — Medication 10 ML: at 22:11

## 2021-09-21 RX ADMIN — GUAIFENESIN SYRUP AND DEXTROMETHORPHAN 5 ML: 100; 10 SYRUP ORAL at 18:22

## 2021-09-21 RX ADMIN — ENOXAPARIN SODIUM 30 MG: 30 INJECTION SUBCUTANEOUS at 07:56

## 2021-09-21 RX ADMIN — GUAIFENESIN SYRUP AND DEXTROMETHORPHAN 5 ML: 100; 10 SYRUP ORAL at 07:56

## 2021-09-21 RX ADMIN — REMDESIVIR 100 MG: 100 INJECTION, POWDER, LYOPHILIZED, FOR SOLUTION INTRAVENOUS at 22:21

## 2021-09-21 RX ADMIN — GUAIFENESIN SYRUP AND DEXTROMETHORPHAN 5 ML: 100; 10 SYRUP ORAL at 13:32

## 2021-09-21 RX ADMIN — PANTOPRAZOLE SODIUM 40 MG: 40 TABLET, DELAYED RELEASE ORAL at 06:30

## 2021-09-21 RX ADMIN — Medication 2000 UNITS: at 07:55

## 2021-09-21 RX ADMIN — SODIUM CHLORIDE 25 ML: 9 INJECTION, SOLUTION INTRAVENOUS at 22:21

## 2021-09-21 RX ADMIN — INSULIN LISPRO 1 UNITS: 100 INJECTION, SOLUTION INTRAVENOUS; SUBCUTANEOUS at 22:11

## 2021-09-21 RX ADMIN — IOPAMIDOL 80 ML: 755 INJECTION, SOLUTION INTRAVENOUS at 09:35

## 2021-09-21 RX ADMIN — BARICITINIB 4 MG: 2 TABLET, FILM COATED ORAL at 15:22

## 2021-09-21 RX ADMIN — ENOXAPARIN SODIUM 30 MG: 30 INJECTION SUBCUTANEOUS at 22:10

## 2021-09-21 RX ADMIN — INSULIN LISPRO 1 UNITS: 100 INJECTION, SOLUTION INTRAVENOUS; SUBCUTANEOUS at 13:33

## 2021-09-21 RX ADMIN — INSULIN LISPRO 1 UNITS: 100 INJECTION, SOLUTION INTRAVENOUS; SUBCUTANEOUS at 18:22

## 2021-09-21 RX ADMIN — SODIUM CHLORIDE 30 ML: 9 INJECTION, SOLUTION INTRAVENOUS at 22:20

## 2021-09-21 RX ADMIN — INSULIN LISPRO 1 UNITS: 100 INJECTION, SOLUTION INTRAVENOUS; SUBCUTANEOUS at 10:18

## 2021-09-21 ASSESSMENT — PAIN SCALES - GENERAL
PAINLEVEL_OUTOF10: 0

## 2021-09-21 NOTE — PROGRESS NOTES
Hospitalist Progress Note      PCP: No primary care provider on file. Date of Admission: 2021    Chief Complaint on Admission: shortness of breath    Pt Seen/Examined and Chart Reviewed. Admitting dx COVID 19    SUBJECTIVE/OBJECTIVE:   Patient is admitted with COVID 19 at the end of first week since symptoms onset. Today patient requires higher level of O2 support. He was on 3 liters in ER and right now with borderline sats on 8 liters. Overall feels about the same. No chest pain, no nausea, emesis, diarrhea. Patient denies need to update family. Encouraged the patient to determine his emergency contact. Allergies  Patient has no known allergies. Medications      Scheduled Meds:   baricitinib  4 mg Oral Daily    sodium chloride flush  5-40 mL IntraVENous 2 times per day    enoxaparin  30 mg SubCUTAneous BID    guaiFENesin-dextromethorphan  5 mL Oral 4x Daily    Vitamin D  2,000 Units Oral Daily    CENTRUM/CERTA-ISSA with minerals oral  15 mL Oral Daily    thiamine mononitrate  100 mg Oral Daily    dexamethasone  20 mg IntraVENous Q24H    remdesivir IVPB  100 mg IntraVENous Q24H    insulin lispro  0-6 Units SubCUTAneous TID WC    insulin lispro  0-3 Units SubCUTAneous Nightly    pantoprazole  40 mg Oral QAM AC       Infusions:   sodium chloride      dextrose         PRN Meds:  sodium chloride flush, sodium chloride, ondansetron **OR** ondansetron, polyethylene glycol, acetaminophen **OR** acetaminophen, sodium chloride, glucose, dextrose, glucagon (rDNA), dextrose    Vitals    TEMPERATURE:  Current - Temp: 97.4 °F (36.3 °C);  Max - Temp  Av.5 °F (36.4 °C)  Min: 96 °F (35.6 °C)  Max: 98.1 °F (36.7 °C)  RESPIRATIONS RANGE: Resp  Av.3  Min: 17  Max: 22  PULSE RANGE: Pulse  Av.6  Min: 71  Max: 108  BLOOD PRESSURE RANGE:  Systolic (18OYA), OCF:585 , Min:103 , TUW:806   ; Diastolic (61HOL), FOT:37, Min:78, Max:92    PULSE OXIMETRY RANGE: SpO2  Av.9 %  Min: 89 % Max: 96 %  24HR INTAKE/OUTPUT:      Intake/Output Summary (Last 24 hours) at 9/21/2021 1458  Last data filed at 9/21/2021 1331  Gross per 24 hour   Intake 1250 ml   Output 675 ml   Net 575 ml       Exam:      General appearance: No apparent distress, appears stated age and cooperative. Lungs: bilateral rales present  Heart: tachy, regular rate and rhythm with Normal S1/S2 without  murmurs, rubs or gallops, point of maximum impulse non-displaced  Abdomen: Soft, non-tender or non-distended without rigidity or guarding and positive bowel sounds all four quadrants. Extremities: No clubbing, cyanosis, or edema bilaterally. Skin: Skin color, texture, turgor normal.    Neurologic: Alert and oriented X 3,  , grossly non-focal.  Mental status: Alert, oriented, thought content appropriate. Data    Recent Labs     09/20/21  1020 09/21/21  0450   WBC 4.5 3.0*   HGB 15.1 15.2   HCT 43.7 44.4    192      Recent Labs     09/20/21  1020 09/20/21  2320   * 131*   K 3.7 3.7   CL 96* 98*   CO2 17* 18*   BUN 20 22*   CREATININE 1.2 1.1     Recent Labs     09/20/21  1020 09/21/21  0450   AST 57* 49*   ALT 19 20   BILIDIR 0.4* 0.3   BILITOT 1.0 0.8   ALKPHOS 64 63     No results for input(s): INR in the last 72 hours. No results for input(s): CKTOTAL, CKMB, CKMBINDEX, TROPONINI in the last 72 hours.     Consults:     IP CONSULT TO HOSPITALIST  IP CONSULT TO PHARMACY    Active Hospital Problems    Diagnosis Date Noted    Pneumonia due to COVID-19 virus [U07.1, J12.82] 09/20/2021         ASSESSMENT AND PLAN      COVID 19 pneumonia with acute hypoxic rsp failure:  Procal, legionella, strep AG negative  CRP moderately elevated at 43  Oxygen requirements are escalating rapidly from 3 to 8  High risk for decompensation  Cont with decadron, remdesivir and start baricitinib    Elevated Ddimer:  CTA chest negative for PE  Venous doppler pending  Cont with lovenox prophy dose for now     BPH:  Monitor for urinary retention     Hyponatremia:  Improved with IVF         DVT Prophylaxis: lovenox  Diet: ADULT DIET;  Regular  Code Status: Full Code    PT/OT Eval Status:at baseline    Dispo - inpt    Nellie Leroy MD

## 2021-09-21 NOTE — ACP (ADVANCE CARE PLANNING)
Advance Care Planning     Advance Care Planning Inpatient Note  Spiritual Care Department    Today's Date: 9/21/2021  Unit: [place] [address] PCU    Received request from [employer]. Upon review of chart and communication with care team, patient's decision making abilities are not in question. . Patient was/were present in the room during visit. BY PHONE    Goals of ACP Conversation:  Discuss advance care planning documents    Health Care Decision Makers:       Primary Decision Maker: Brandi Ulrich - Brother/Sister    Summary:  Documented Next of Kin, per patient report  PT does not have HCPOA and has named his brother [facility] as his decision maker. However, he state that he does not have his phone number and did not want to name any one else. I advised him that once he obtains the number, his RN can populate the phone number. I emphasize how important this is. Advance Care Planning Documents (Patient Wishes):  None     Assessment:  PT seems to be in good spirits.      Interventions:  Discussed and provided education on state decision maker hierarchy  Encouraged ongoing ACP conversation with future decision makers and loved ones    Care Preferences Communicated:   No    Electronically signed by Chaplain Divina on 9/21/2021 at 12:58 PM

## 2021-09-21 NOTE — CARE COORDINATION
of the hospital at discharge, or pharmacy can deliver to the bedside if staff is available. (payment due at time of pick-up or delivery - cash, check, or card accepted)     Able to afford home medications/ co-pay costs: Yes    ADLS  Support Systems: Friends/Neighbors    PT AM-PAC:   /24  OT AM-PAC:   /24    [place]: from home alone  Steps: 4-5 to enter    Plans to RETURN to current housing: Yes  Barriers to RETURNING to current housing: medical complications    Govind Guzman 79  Currently ACTIVE with [address]: No  Home Care Agency: Not Applicable    Currently ACTIVE with [place] on Aging: No      Durable Medical Equipment  DME Provider: n/a  Equipment: none    Home Oxygen and [address] [place] prior to admission: No  [address]: Not Applicable    DISCHARGE PLAN:  Disposition: Home- No Services Needed    Transportation PLAN for discharge: friend     Factors facilitating achievement of predicted outcomes: Motivated, Cooperative and Pleasant    Barriers to discharge: Medical complications and Medication managment    Additional Case Management Notes: Pt Independent from home alone, currently on 6 L none at home, will continue to wean. No CM needs anticipated. A friend can transport at discharge. CM reached out to pt and Gerry Andrade in public benefits as no insurance listed. Pt states he has insurance but it is not active per [place] A[place]. She will screen him over the phone for Medicaid eligibility . The Plan for Transition of Care is related to the following treatment goals of Pneumonia due to COVID-19 virus [U07.1, J12.82]  COVID-19 [U07.1]    The Patient and/or patient representative Kate Myoa and his family were provided with a choice of provider and agrees with the discharge plan Yes    Freedom of choice list was provided with basic dialogue that supports the patient's individualized plan of care/goals and shares the quality data associated with the providers.  Yes    Care Transition patient: Padmini Case RN  [facility]  Case Management Department  Ph: [phone]   Fax: [phone]

## 2021-09-21 NOTE — PROGRESS NOTES
AM assessment completed. /81   Pulse 75   Temp 97.4 °F (36.3 °C) (Oral)   Resp 18   Ht 5' 6\" (1.676 m)   Wt 167 lb (75.8 kg)   SpO2 90%   BMI 26.95 kg/m²   Pt denies pain or SOB.

## 2021-09-21 NOTE — PLAN OF CARE
Problem: Pain:  Goal: Pain level will decrease  Description: Pain level will decrease  Outcome: Ongoing  Note: Pt denies pain this shift. Will continue to monitor. Problem: Isolation Precautions - Risk of Spread of Infection  Goal: Prevent transmission of infection  Note: Pt remains in droplet plus isolation for Covid-19 infection. Educated on hand hygiene and wearing a mask when outside of room. Will continue to monitor. Problem: Falls - Risk of:  Goal: Will remain free from falls  Description: Will remain free from falls  Note: Call light within reach, bed alarm on, and non-skid socks in place. Will continue to monitor.

## 2021-09-21 NOTE — FLOWSHEET NOTE
09/21/21 1303   Encounter Summary   Services provided to: Family   Referral/Consult From: Palliative Care   Continue Visiting   (9/21, caro )   Complexity of Encounter High   Length of Encounter 30 minutes   Advance Care Planning Yes   Routine   Type Initial   Assessment Calm; Approachable; Hopeful   Intervention Active listening;Explored feelings, thoughts, concerns;Nurtured hope   Outcome Comfort;Expressed gratitude;Engaged in conversation   Primary Decision Maker (Healthcare Proxy)   [address] [facility] is: Legal Next of Kin  (Older Brother, Ann Marie Benito  )   See APC note.   Staff Suzanna Harry MA

## 2021-09-21 NOTE — PLAN OF CARE
Problem: Gas Exchange - Impaired  Goal: Absence of hypoxia  Outcome: Ongoing  Note: Pt on 6L of O2 with saturations in the 90's. No complaints of SOB. Pt receiving IV decadron and IV remdesivir. Will continue to monitor     Problem: Body Temperature -  Risk of, Imbalanced  Goal: Ability to maintain a body temperature within defined limits  Outcome: Ongoing  Note: Pt afebrile throughout shift thus far. No complaints of chills.  Will continue to monitor

## 2021-09-21 NOTE — PROGRESS NOTES
Pt states he will update family on his own. No family member updated on this shift.  Electronically signed by Lakisha Castellanos RN on 9/21/2021 at 3:53 PM

## 2021-09-21 NOTE — PROGRESS NOTES
Physician Progress Note      Prakash Baker  CSN #:                  [id number]  :                       1955  ADMIT DATE:       2021 9:51 AM  DISCH DATE:  RESPONDING  PROVIDER #:        Pam Salinas MD          QUERY TEXT:    Pt admitted with COVID PNA. Pt noted to have elevated WBC and . If possible,   please document in the progress notes and discharge summary if you are   evaluating and /or treating any of the following: The medical record reflects the following:  Risk Factors: 61 y/o male with COVID PNA  Clinical Indicators: WBC 3.0  , RR 20-22  Per H&P: \"Pneumonia due to   COVID-19 virus; Supriya Tucker is a 37 y.o. male who presents to the emergency   department with chief complaint of shortness of breath. Tested positive for   Covid on . Has been having symptoms for 10 days. Unvaccinated. \"  Treatment: procal, legionella, strep AG, CRP, Ddimer  If Ddimer elevated- will get CTPA. Decadron, Remdesivir  Options provided:  -- Sepsis, present on admission due to COVID PNA  -- No Sepsis  -- Other - I will add my own diagnosis  -- Disagree - Not applicable / Not valid  -- Disagree - Clinically unable to determine / Unknown  -- Refer to Clinical Documentation Reviewer    PROVIDER RESPONSE TEXT:    This patient has sepsis which was present on admission, due to COVID PNA.     Query created by: Barbara Okeefe on 2021 9:45 AM      Electronically signed by:  Pam Salinas MD 2021 3:32 PM

## 2021-09-22 LAB
ALBUMIN SERPL-MCNC: 2.9 G/DL (ref 3.4–5)
ALP BLD-CCNC: 56 U/L (ref 40–129)
ALT SERPL-CCNC: 19 U/L (ref 10–40)
ANION GAP SERPL CALCULATED.3IONS-SCNC: 13 MMOL/L (ref 3–16)
AST SERPL-CCNC: 43 U/L (ref 15–37)
BILIRUB SERPL-MCNC: 0.7 MG/DL (ref 0–1)
BILIRUBIN DIRECT: <0.2 MG/DL (ref 0–0.3)
BILIRUBIN, INDIRECT: ABNORMAL MG/DL (ref 0–1)
BUN BLDV-MCNC: 26 MG/DL (ref 7–20)
C-REACTIVE PROTEIN: 16.3 MG/L (ref 0–5.1)
CALCIUM SERPL-MCNC: 8.1 MG/DL (ref 8.3–10.6)
CHLORIDE BLD-SCNC: 105 MMOL/L (ref 99–110)
CO2: 20 MMOL/L (ref 21–32)
CREAT SERPL-MCNC: 1 MG/DL (ref 0.8–1.3)
D DIMER: 1045 NG/ML DDU (ref 0–229)
GFR AFRICAN AMERICAN: >60
GFR NON-AFRICAN AMERICAN: >60
GLUCOSE BLD-MCNC: 117 MG/DL (ref 70–99)
GLUCOSE BLD-MCNC: 132 MG/DL (ref 70–99)
GLUCOSE BLD-MCNC: 148 MG/DL (ref 70–99)
GLUCOSE BLD-MCNC: 150 MG/DL (ref 70–99)
GLUCOSE BLD-MCNC: 200 MG/DL (ref 70–99)
PERFORMED ON: ABNORMAL
PHOSPHORUS: 3.8 MG/DL (ref 2.5–4.9)
POTASSIUM SERPL-SCNC: 4 MMOL/L (ref 3.5–5.1)
SODIUM BLD-SCNC: 138 MMOL/L (ref 136–145)
TOTAL PROTEIN: 6.9 G/DL (ref 6.4–8.2)

## 2021-09-22 PROCEDURE — 36415 COLL VENOUS BLD VENIPUNCTURE: CPT

## 2021-09-22 PROCEDURE — 6370000000 HC RX 637 (ALT 250 FOR IP): Performed by: INTERNAL MEDICINE

## 2021-09-22 PROCEDURE — 80076 HEPATIC FUNCTION PANEL: CPT

## 2021-09-22 PROCEDURE — 99222 1ST HOSP IP/OBS MODERATE 55: CPT | Performed by: INTERNAL MEDICINE

## 2021-09-22 PROCEDURE — 80069 RENAL FUNCTION PANEL: CPT

## 2021-09-22 PROCEDURE — 86140 C-REACTIVE PROTEIN: CPT

## 2021-09-22 PROCEDURE — 94761 N-INVAS EAR/PLS OXIMETRY MLT: CPT

## 2021-09-22 PROCEDURE — 6360000002 HC RX W HCPCS: Performed by: INTERNAL MEDICINE

## 2021-09-22 PROCEDURE — 2700000000 HC OXYGEN THERAPY PER DAY

## 2021-09-22 PROCEDURE — 2060000000 HC ICU INTERMEDIATE R&B

## 2021-09-22 PROCEDURE — 85379 FIBRIN DEGRADATION QUANT: CPT

## 2021-09-22 PROCEDURE — 2580000003 HC RX 258: Performed by: INTERNAL MEDICINE

## 2021-09-22 PROCEDURE — 2500000003 HC RX 250 WO HCPCS: Performed by: INTERNAL MEDICINE

## 2021-09-22 RX ORDER — DEXAMETHASONE SODIUM PHOSPHATE 4 MG/ML
10 INJECTION, SOLUTION INTRA-ARTICULAR; INTRALESIONAL; INTRAMUSCULAR; INTRAVENOUS; SOFT TISSUE DAILY
Status: DISCONTINUED | OUTPATIENT
Start: 2021-09-26 | End: 2021-09-24

## 2021-09-22 RX ADMIN — REMDESIVIR 100 MG: 100 INJECTION, POWDER, LYOPHILIZED, FOR SOLUTION INTRAVENOUS at 21:01

## 2021-09-22 RX ADMIN — GUAIFENESIN SYRUP AND DEXTROMETHORPHAN 5 ML: 100; 10 SYRUP ORAL at 20:52

## 2021-09-22 RX ADMIN — GUAIFENESIN SYRUP AND DEXTROMETHORPHAN 5 ML: 100; 10 SYRUP ORAL at 08:38

## 2021-09-22 RX ADMIN — BARICITINIB 4 MG: 2 TABLET, FILM COATED ORAL at 08:40

## 2021-09-22 RX ADMIN — GUAIFENESIN SYRUP AND DEXTROMETHORPHAN 5 ML: 100; 10 SYRUP ORAL at 17:38

## 2021-09-22 RX ADMIN — ENOXAPARIN SODIUM 30 MG: 30 INJECTION SUBCUTANEOUS at 20:52

## 2021-09-22 RX ADMIN — PANTOPRAZOLE SODIUM 40 MG: 40 TABLET, DELAYED RELEASE ORAL at 04:57

## 2021-09-22 RX ADMIN — INSULIN LISPRO 2 UNITS: 100 INJECTION, SOLUTION INTRAVENOUS; SUBCUTANEOUS at 13:14

## 2021-09-22 RX ADMIN — ENOXAPARIN SODIUM 30 MG: 30 INJECTION SUBCUTANEOUS at 08:38

## 2021-09-22 RX ADMIN — SODIUM CHLORIDE 30 ML: 9 INJECTION, SOLUTION INTRAVENOUS at 21:00

## 2021-09-22 RX ADMIN — Medication 100 MG: at 08:38

## 2021-09-22 RX ADMIN — GUAIFENESIN SYRUP AND DEXTROMETHORPHAN 5 ML: 100; 10 SYRUP ORAL at 13:16

## 2021-09-22 RX ADMIN — Medication 2000 UNITS: at 08:38

## 2021-09-22 RX ADMIN — Medication 10 ML: at 20:52

## 2021-09-22 RX ADMIN — DEXAMETHASONE SODIUM PHOSPHATE 20 MG: 4 INJECTION, SOLUTION INTRAMUSCULAR; INTRAVENOUS at 21:02

## 2021-09-22 RX ADMIN — Medication 10 ML: at 08:39

## 2021-09-22 RX ADMIN — SODIUM CHLORIDE 25 ML: 9 INJECTION, SOLUTION INTRAVENOUS at 21:01

## 2021-09-22 ASSESSMENT — PAIN SCALES - GENERAL
PAINLEVEL_OUTOF10: 0

## 2021-09-22 NOTE — CARE COORDINATION
CM following. Pt from home alone. COVID-19 POSITIVE. O2 14L, Remdesivir, and Decadron. Will need PT/OT evals for discharge planning.      Solange Oviedo RN, BSN, [address]  Case Management Department  [phone]

## 2021-09-22 NOTE — PLAN OF CARE
Problem: Pain:  Goal: Pain level will decrease  Description: Pain level will decrease  9/21/2021 2254 by Nila Winkler RN  Outcome: Ongoing     Problem: Airway Clearance - Ineffective  Goal: Achieve or maintain patent airway  Outcome: Ongoing     Problem: Gas Exchange - Impaired  Goal: Absence of hypoxia  Outcome: Ongoing     Problem: Falls - Risk of:  Goal: Will remain free from falls  Description: Will remain free from falls  9/21/2021 2254 by Nila Winkler RN  Outcome: Ongoing

## 2021-09-22 NOTE — CONSULTS
Pulmonology Consult Note  PGY-2    PCP: No primary care provider on file. Code:Full Code  Admit Date: 9/20/2021  Diet: ADULT DIET; Regular      History of present illness:      CC: Shortness of breath    Patient is a 60 y.o. male with a PMHx of BPH, s/p TURP presenting with chief complain of shortness of breath. Patient mentions that he was in contact with a friend who was diagnosed with Covid and passed away. He was feeling tired and did not have much appetite. He went to an urgent care and had Covid test and was positive. Patient mentions that he continued to feel tired and did not have appetite. He also endorsed non productive cough, shortness of breath, and palpitation. He denies having fever, chest pain, constipation, diarrhea, abdominal pain, loss of taste/smell, numbness, tingling. He did not receive Covid vaccine. Since he he was feeling tired and did not have appetite, he decided to come to ED. Patient mentions that he does not smoke tobacco and does not drink. Mentions that he is healthy and does not have any lung or heart problem. In ED, T 98.3F, RR 20, , /91, SpO2 84% on room air. Labs were significant for Na 129, Bicarb 17, CRP 44. Chest x ray showed coarse bilateral basilar predominant consolidation is compatible with atypical/viral pneumonia. Covid test came back positive. ROS: Review of Systems -    All other systems reviewed and are negative. Past Medical / Surgical History:    Past Medical History:   Diagnosis Date    BPH (benign prostatic hyperplasia)      Past Surgical History:   Procedure Laterality Date    TURP  02/07/2018    cysto       Medications Prior to Admission:    No current facility-administered medications on file prior to encounter.      Current Outpatient Medications on File Prior to Encounter   Medication Sig Dispense Refill    Multiple Vitamin (MULTIVITAMIN ADULT PO) Take 1 tablet by mouth daily         Allergies:  Patient has no known 150*   ANIONGAP 16 15 13     Hepatic:   Recent Labs     09/20/21  1020 09/21/21  0450 09/22/21  0443   AST 57* 49* 43*   ALT 19 20 19   BILITOT 1.0 0.8 0.7   ALKPHOS 64 63 56     Troponin: No results for input(s): TROPONINI in the last 72 hours. BNP: No results for input(s): BNP in the last 72 hours. Lipids: No results for input(s): CHOL, HDL in the last 72 hours. Invalid input(s): LDLCALCU, TRIGLYCERIDE  INR: No results for input(s): INR in the last 72 hours. Lactate: No results for input(s): LACTATE in the last 72 hours. ABGs:No results for input(s): PHART, IQU2PHB, PO2ART, BPA0CQC, BEART, THGBART, A5ABIDIK, JUR8UVQ in the last 72 hours. UA:No results for input(s): NITRITE, COLORU, PHUR, LABCAST, WBCUA, RBCUA, MUCUS, TRICHOMONAS, YEAST, BACTERIA, CLARITYU, SPECGRAV, LEUKOCYTESUR, UROBILINOGEN, BILIRUBINUR, BLOODU, GLUCOSEU, AMORPHOUS in the last 72 hours. Invalid input(s): Gonzalez Moya     IMAGING:  VL Extremity Venous Bilateral         CTA PULMONARY W CONTRAST   Final Result   1. No evidence of pulmonary embolus. 2. Diffuse bilateral ground glass pulmonary opacities representing a classic appearance for COVID pneumonia. XR CHEST PORTABLE   Final Result      Coarse bilateral basilar predominant consolidation is compatible with atypical/viral pneumonia in the proper clinical context. Elevated right hemidiaphragm. Normal cardiomediastinal silhouette. Assessment & Plan:    Regla Bernal is a 60 y.o. male with PMHx significant for BPH s/p TRUP who was admitted for shortness of breath. Acute hypoxic respiratory failure:  - 2/2 to most likely Covid pneumonia. - Pt presented with SOB, fatigue, decrease in appetite. He did not receive Covid shot. - In ED, T 98.3F, RR 20, , /91, SpO2 84% on room air. Currently, Patient is on 14L of HFNC with SpO2 of low 90s.   - Chest x ray showed coarse bilateral basilar predominant consolidation is compatible with atypical/viral pneumonia. - Covid test positive. - Strep pneumo antigen and Legionella antigen negative  - Respiratory culture pending  - D Dimer 5150--> 1045  - Chest CT showed diffuse bilateral ground glass pulmonary opacities representing a classic appearance for COVID pneumonia. - Continue IV Dexamethasone 20 mg daily for 5 days (day3). - S/p Remdesivir 200 mg once   - Remdesivir 100 mg for 4 days (day 2)  -  Daily D Dimer and CBC    Elevated D Dimer:  - CT chest negative for PE  - Doppler U/S of LE showed no evidence of deep or superficial venous thrombosis   - Daily D Dimer         Code Status: Full Code  ADULT DIET; Regular  PPX: Protonix; Madai Hendrix MD, PGY- 2  Contact via [facility]  9/22/2021,  8:43 AM    This patient will be discussed with attending, Dr. Amy Whiting MD.    Patient seen, examined and discussed with the resident and I agree with the assessment and plan. Briefly, this is a 60 y.o. male  with acute hypoxemic respiratory failure 2/2 covid 19. Symptoms started 9/15. On 14 L high flow. CRP under 75. D-dimer elevated but ctpa and LE dopplers were negative. Currently on remdesivir, baricitinib, and Decadron 20 mg  I had an additional order for Decadron 10 mg for 5 days to start after the 20 mg dose is complete. Hopefully oxygen requirements continue to come down and he will have a long hospital stay. However he is only on day 7 of symptoms so he may get worse before he gets better.     Tayler Salazar MD

## 2021-09-22 NOTE — PROGRESS NOTES
Pt states he does not wish to have shift update called at this time.   Electronically signed by Moose Nichols RN on 9/22/2021 at 2:46 PM

## 2021-09-22 NOTE — PROGRESS NOTES
AM assessment completed. /81   Pulse 64   Temp 96.2 °F (35.7 °C) (Axillary)   Resp 20   Ht 5' 6\" (1.676 m)   Wt 167 lb (75.8 kg)   SpO2 91%   BMI 26.95 kg/m² . Pt denies pain or SOB.

## 2021-09-22 NOTE — PROGRESS NOTES
Hospitalist Progress Note      PCP: No primary care provider on file. Date of Admission: 2021    Chief Complaint on Admission: shortness of breath    Pt Seen/Examined and Chart Reviewed. Admitting dx COVID 19    SUBJECTIVE/OBJECTIVE:   Patient is admitted with COVID 19 at the end of first week since symptoms onset (9/15). Currently patient is on 14 liters O2, up from initial 3 in ER. He denies any subjective worsening of his breathing. He is optimistic about his recovery. No chest pain. Tolerating PO well. Urinating. Patient denies need to update his family/friends. Allergies  Patient has no known allergies. Medications      Scheduled Meds:   [START ON 2021] dexamethasone  10 mg IntraVENous Daily    baricitinib  4 mg Oral Daily    sodium chloride flush  5-40 mL IntraVENous 2 times per day    enoxaparin  30 mg SubCUTAneous BID    guaiFENesin-dextromethorphan  5 mL Oral 4x Daily    Vitamin D  2,000 Units Oral Daily    CENTRUM/CERTA-ISSA with minerals oral  15 mL Oral Daily    thiamine mononitrate  100 mg Oral Daily    dexamethasone  20 mg IntraVENous Q24H    remdesivir IVPB  100 mg IntraVENous Q24H    insulin lispro  0-6 Units SubCUTAneous TID WC    insulin lispro  0-3 Units SubCUTAneous Nightly    pantoprazole  40 mg Oral QAM AC       Infusions:   sodium chloride 25 mL (21)    dextrose         PRN Meds:  sodium chloride flush, sodium chloride, ondansetron **OR** ondansetron, polyethylene glycol, acetaminophen **OR** acetaminophen, sodium chloride, glucose, dextrose, glucagon (rDNA), dextrose    Vitals    TEMPERATURE:  Current - Temp: 96.4 °F (35.8 °C);  Max - Temp  Av.1 °F (36.2 °C)  Min: 96.2 °F (35.7 °C)  Max: 97.6 °F (36.4 °C)  RESPIRATIONS RANGE: Resp  Av.6  Min: 18  Max: 25  PULSE RANGE: Pulse  Av.5  Min: 64  Max: 86  BLOOD PRESSURE RANGE:  Systolic (76XJU), QK , Min:122 , AFH:343   ; Diastolic (87VMR), CZA:32, Min:80, Max:90    PULSE OXIMETRY RANGE: SpO2  Av.3 %  Min: 86 %  Max: 95 %  24HR INTAKE/OUTPUT:      Intake/Output Summary (Last 24 hours) at 2021 1446  Last data filed at 2021 1152  Gross per 24 hour   Intake 340 ml   Output 200 ml   Net 140 ml       Exam:      General appearance: No apparent distress, appears stated age and cooperative. Lungs: bilateral rales present  Heart: tachy, regular rate and rhythm with Normal S1/S2 without  murmurs, rubs or gallops, point of maximum impulse non-displaced  Abdomen: Soft, non-tender or non-distended without rigidity or guarding and positive bowel sounds all four quadrants. Extremities: No clubbing, cyanosis, or edema bilaterally. Skin: Skin color, texture, turgor normal.    Neurologic: Alert and oriented X 3,  , grossly non-focal.  Mental status: Alert, oriented, thought content appropriate. Data    Recent Labs     21  1020 21  0450   WBC 4.5 3.0*   HGB 15.1 15.2   HCT 43.7 44.4    192      Recent Labs     21  1020 21  2320 21  0443   * 131* 138   K 3.7 3.7 4.0   CL 96* 98* 105   CO2 17* 18* 20*   PHOS  --   --  3.8   BUN 20 22* 26*   CREATININE 1.2 1.1 1.0     Recent Labs     21  1020 21  0450 21  0443   AST 57* 49* 43*   ALT 19 20 19   BILIDIR 0.4* 0.3 <0.2   BILITOT 1.0 0.8 0.7   ALKPHOS 64 63 56     No results for input(s): INR in the last 72 hours. No results for input(s): CKTOTAL, CKMB, CKMBINDEX, TROPONINI in the last 72 hours.     Consults:     IP CONSULT TO HOSPITALIST  IP CONSULT TO PHARMACY  IP CONSULT TO PULMONOLOGY    Active Hospital Problems    Diagnosis Date Noted    Pneumonia due to COVID-19 virus [U07.1, J12.82] 2021         ASSESSMENT AND PLAN      COVID 19 pneumonia with acute hypoxic resp failure:  Procal, legionella, strep AG negative  CRP moderately elevated at 43  Oxygen requirements are escalating rapidly   Cont with high dose decadron, remdesivir and baricitinib  Consult

## 2021-09-22 NOTE — PLAN OF CARE
Problem: Pain:  Goal: Pain level will decrease  Description: Pain level will decrease  Outcome: Ongoing  Note: Pt has not endorsed pain this shift. Problem: Gas Exchange - Impaired  Goal: Absence of hypoxia  Outcome: Ongoing  Note: Pt weaned from 14L to 10L high flow oxygen. Tolerating with oxygen saturations 90-97%. Problem: Fatigue  Goal: Verbalize increase energy and improved vitality  Outcome: Ongoing  Note: Walked around room with pt and he endorsed increased energy after being able to walk around. Ambulation with assistance encouraged. Problem: Serum Glucose Level - Abnormal:  Goal: Ability to maintain appropriate glucose levels will improve  Description: Ability to maintain appropriate glucose levels will improve  Outcome: Ongoing  Note: Pt educated on appropriate glucose levels and the need for insulin.

## 2021-09-22 NOTE — PROGRESS NOTES
Pt A&Ox4 and states he does not wish to have a shift update called at this time.  Electronically signed by Chastity Paul RN on 9/21/2021 at 9:27 PM

## 2021-09-23 LAB
ALBUMIN SERPL-MCNC: 2.9 G/DL (ref 3.4–5)
ALP BLD-CCNC: 55 U/L (ref 40–129)
ALT SERPL-CCNC: 19 U/L (ref 10–40)
ANION GAP SERPL CALCULATED.3IONS-SCNC: 10 MMOL/L (ref 3–16)
AST SERPL-CCNC: 34 U/L (ref 15–37)
BILIRUB SERPL-MCNC: 0.7 MG/DL (ref 0–1)
BILIRUBIN DIRECT: 0.3 MG/DL (ref 0–0.3)
BILIRUBIN, INDIRECT: 0.4 MG/DL (ref 0–1)
BUN BLDV-MCNC: 29 MG/DL (ref 7–20)
CALCIUM SERPL-MCNC: 7.9 MG/DL (ref 8.3–10.6)
CHLORIDE BLD-SCNC: 105 MMOL/L (ref 99–110)
CO2: 23 MMOL/L (ref 21–32)
CREAT SERPL-MCNC: 1 MG/DL (ref 0.8–1.3)
D DIMER: 945 NG/ML DDU (ref 0–229)
GFR AFRICAN AMERICAN: >60
GFR NON-AFRICAN AMERICAN: >60
GLUCOSE BLD-MCNC: 120 MG/DL (ref 70–99)
GLUCOSE BLD-MCNC: 121 MG/DL (ref 70–99)
GLUCOSE BLD-MCNC: 135 MG/DL (ref 70–99)
GLUCOSE BLD-MCNC: 144 MG/DL (ref 70–99)
GLUCOSE BLD-MCNC: 192 MG/DL (ref 70–99)
PERFORMED ON: ABNORMAL
PHOSPHORUS: 3.7 MG/DL (ref 2.5–4.9)
POTASSIUM SERPL-SCNC: 4 MMOL/L (ref 3.5–5.1)
PROCALCITONIN: 0.08 NG/ML (ref 0–0.15)
SODIUM BLD-SCNC: 138 MMOL/L (ref 136–145)
TOTAL PROTEIN: 6.6 G/DL (ref 6.4–8.2)

## 2021-09-23 PROCEDURE — 84145 PROCALCITONIN (PCT): CPT

## 2021-09-23 PROCEDURE — 2700000000 HC OXYGEN THERAPY PER DAY

## 2021-09-23 PROCEDURE — 6370000000 HC RX 637 (ALT 250 FOR IP): Performed by: INTERNAL MEDICINE

## 2021-09-23 PROCEDURE — 99232 SBSQ HOSP IP/OBS MODERATE 35: CPT | Performed by: INTERNAL MEDICINE

## 2021-09-23 PROCEDURE — 85379 FIBRIN DEGRADATION QUANT: CPT

## 2021-09-23 PROCEDURE — 80076 HEPATIC FUNCTION PANEL: CPT

## 2021-09-23 PROCEDURE — 6360000002 HC RX W HCPCS: Performed by: INTERNAL MEDICINE

## 2021-09-23 PROCEDURE — 2500000003 HC RX 250 WO HCPCS: Performed by: INTERNAL MEDICINE

## 2021-09-23 PROCEDURE — 36415 COLL VENOUS BLD VENIPUNCTURE: CPT

## 2021-09-23 PROCEDURE — 2580000003 HC RX 258: Performed by: INTERNAL MEDICINE

## 2021-09-23 PROCEDURE — 94761 N-INVAS EAR/PLS OXIMETRY MLT: CPT

## 2021-09-23 PROCEDURE — 80069 RENAL FUNCTION PANEL: CPT

## 2021-09-23 PROCEDURE — 2060000000 HC ICU INTERMEDIATE R&B

## 2021-09-23 RX ADMIN — REMDESIVIR 100 MG: 100 INJECTION, POWDER, LYOPHILIZED, FOR SOLUTION INTRAVENOUS at 20:35

## 2021-09-23 RX ADMIN — GUAIFENESIN SYRUP AND DEXTROMETHORPHAN 5 ML: 100; 10 SYRUP ORAL at 13:44

## 2021-09-23 RX ADMIN — ENOXAPARIN SODIUM 30 MG: 30 INJECTION SUBCUTANEOUS at 10:17

## 2021-09-23 RX ADMIN — Medication 10 ML: at 20:30

## 2021-09-23 RX ADMIN — BARICITINIB 4 MG: 2 TABLET, FILM COATED ORAL at 10:18

## 2021-09-23 RX ADMIN — GUAIFENESIN SYRUP AND DEXTROMETHORPHAN 5 ML: 100; 10 SYRUP ORAL at 10:17

## 2021-09-23 RX ADMIN — Medication 10 ML: at 10:17

## 2021-09-23 RX ADMIN — Medication 100 MG: at 10:17

## 2021-09-23 RX ADMIN — PANTOPRAZOLE SODIUM 40 MG: 40 TABLET, DELAYED RELEASE ORAL at 06:07

## 2021-09-23 RX ADMIN — SODIUM CHLORIDE 25 ML: 9 INJECTION, SOLUTION INTRAVENOUS at 20:34

## 2021-09-23 RX ADMIN — GUAIFENESIN SYRUP AND DEXTROMETHORPHAN 5 ML: 100; 10 SYRUP ORAL at 18:06

## 2021-09-23 RX ADMIN — Medication 2000 UNITS: at 10:18

## 2021-09-23 RX ADMIN — INSULIN LISPRO 1 UNITS: 100 INJECTION, SOLUTION INTRAVENOUS; SUBCUTANEOUS at 13:42

## 2021-09-23 RX ADMIN — ENOXAPARIN SODIUM 30 MG: 30 INJECTION SUBCUTANEOUS at 20:30

## 2021-09-23 RX ADMIN — DEXAMETHASONE SODIUM PHOSPHATE 20 MG: 4 INJECTION, SOLUTION INTRAMUSCULAR; INTRAVENOUS at 20:34

## 2021-09-23 RX ADMIN — GUAIFENESIN SYRUP AND DEXTROMETHORPHAN 5 ML: 100; 10 SYRUP ORAL at 20:30

## 2021-09-23 RX ADMIN — SODIUM CHLORIDE 30 ML: 9 INJECTION, SOLUTION INTRAVENOUS at 20:35

## 2021-09-23 ASSESSMENT — PAIN SCALES - GENERAL
PAINLEVEL_OUTOF10: 0

## 2021-09-23 NOTE — PLAN OF CARE
Problem: Pain:  Goal: Pain level will decrease  Description: Pain level will decrease  9/23/2021 0244 by Glokaterina Hopping  Outcome: Ongoing   Pt has denied pain during shift. Problem: Gas Exchange - Impaired  Goal: Absence of hypoxia  9/23/2021 0244 by Glokaterina Hopping  Outcome: Ongoing   Pt is currently on 8L high flow NC and SpO2 has been WNL. Problem: Body Temperature -  Risk of, Imbalanced  Goal: Will regain or maintain usual level of consciousness  Outcome: Ongoing   Pt is alert and oriented x4 and follows commands appropriately. Problem: Body Temperature -  Risk of, Imbalanced  Goal: Complications related to the disease process, condition or treatment will be avoided or minimized  Outcome: Ongoing   Pt is on IV decadron and remdesivir for treatment of COVID. Problem: Isolation Precautions - Risk of Spread of Infection  Goal: Prevent transmission of infection  Outcome: Ongoing   Pt is in droplet plus precautions r/t positive COVID test.    Problem: Risk for Fluid Volume Deficit  Goal: Maintain normal heart rhythm  Outcome: Ongoing   Pt is on continuous telemetry and heart rhythm is NSR. Problem: Falls - Risk of:  Goal: Will remain free from falls  Description: Will remain free from falls  Outcome: Ongoing   Pt's bed is in lowest position, brake and bed exit alarm are on, call light and bedside table are within reach. Problem: Serum Glucose Level - Abnormal:  Goal: Ability to maintain appropriate glucose levels will improve  Description: Ability to maintain appropriate glucose levels will improve  9/23/2021 0244 by Glokaterina Hopping  Outcome: Ongoing   Pt's blood sugar is being checked ACHS and is being covered with insulin lispro.

## 2021-09-23 NOTE — CARE COORDINATION
CM continues to follow for discharge planning and needs. Patient weaning on O2, currently down to 8L today. Patient on day 4 of 5 of Remdesivir and Decadron. Patient noted to not have any insurance payor, if plan for DC over the weekend, patient may benefit from meds to beds on Friday, so financial assistance can be provided by CM if needed. Patient continues to plan for return home at DC, if need home O2, will be private pay as no insurance noted. Will need testing by respiratory and ATTENDING to write order for home O2 after testing completed. CM will continue to follow for DC planning and needs.     Thank you,  Oksana Limon RN,   4th Floor Progressive Care Unit  [phone]

## 2021-09-23 NOTE — PROGRESS NOTES
HOSPITALISTS PROGRESS NOTE    9/23/2021 12:24 PM        Name: Joy Polanco . Admitted: 9/20/2021  Primary Care Provider: No primary care provider on file. (Tel: None)      Problem List  Active Problems:    Pneumonia due to COVID-19 virus  Resolved Problems:    * No resolved hospital problems. *       Assessment & Plan:   Acute hypoxic respiratory failure secondary to Covid pneumonia  Improved oxygen requirement as compared to yesterday  We will need to remdesivir for another 2 days, on dexamethasone  baricitinib      Mild hyponatremia  Improved    bph  Monitor for urinary retention    Hyponatremia  Improved     IV Access: peripheral  Rueda:   Diet: ADULT DIET; Regular  Code:Full Code  DVT PPX lovenox   Disposition  Monitor in the hospital, improving oxygenation reassuring,     Brief Course: Patient is admitted with COVID 19 at the end of first week since symptoms onset (9/15).        CC: sob    Subjective:  .   Patient feels better as compared to yesterday,   Sob is better       Reviewed interval ancillary notes    Current Medications  [START ON 9/26/2021] dexamethasone (DECADRON) injection 10 mg, Daily  baricitinib (OLUMIANT) tablet 4 mg, Daily  sodium chloride flush 0.9 % injection 5-40 mL, 2 times per day  sodium chloride flush 0.9 % injection 5-40 mL, PRN  0.9 % sodium chloride infusion, PRN  enoxaparin (LOVENOX) injection 30 mg, BID  ondansetron (ZOFRAN-ODT) disintegrating tablet 4 mg, Q8H PRN   Or  ondansetron (ZOFRAN) injection 4 mg, Q6H PRN  polyethylene glycol (GLYCOLAX) packet 17 g, Daily PRN  acetaminophen (TYLENOL) tablet 650 mg, Q6H PRN   Or  acetaminophen (TYLENOL) suppository 650 mg, Q6H PRN  guaiFENesin-dextromethorphan (ROBITUSSIN DM) 100-10 MG/5ML syrup 5 mL, 4x Daily  Vitamin D (CHOLECALCIFEROL) tablet 2,000 Units, Daily  CENTRUM/CERTA-ISSA with minerals oral solution 15 mL, Daily  thiamine mononitrate tablet 100 mg, Daily  dexamethasone (DECADRON) 20 mg in sodium chloride 0.9 % IVPB, Q24H  remdesivir 100 mg in sodium chloride 0.9 % 250 mL IVPB, Q24H  0.9 % sodium chloride bolus, PRN  insulin lispro (1 Unit Dial) 0-6 Units, TID WC  insulin lispro (1 Unit Dial) 0-3 Units, Nightly  pantoprazole (PROTONIX) tablet 40 mg, QAM AC  glucose (GLUTOSE) 40 % oral gel 15 g, PRN  dextrose 50 % IV solution, PRN  glucagon (rDNA) injection 1 mg, PRN  dextrose 5 % solution, PRN        Objective:  /88   Pulse 96   Temp 97.8 °F (36.6 °C) (Oral)   Resp 18   Ht 5' 6\" (1.676 m)   Wt 167 lb (75.8 kg)   SpO2 94%   BMI 26.95 kg/m²     Intake/Output Summary (Last 24 hours) at 9/23/2021 1224  Last data filed at 9/23/2021 0700  Gross per 24 hour   Intake 1032.57 ml   Output 1000 ml   Net 32.57 ml      Wt Readings from Last 3 Encounters:   09/20/21 167 lb (75.8 kg)   02/20/18 154 lb (69.9 kg)   02/07/18 150 lb 5.7 oz (68.2 kg)       General appearance:  Appears comfortable  Eyes: Sclera clear. Pupils equal.  ENT: Moist oral mucosa. Trachea midline, no adenopathy. Cardiovascular: Regular rhythm, normal S1, S2. No murmur. No edema in lower extremities  Respiratory: Not using accessory muscles. Good inspiratory effort. Clear to auscultation bilaterally, no wheeze or crackles. GI: Abdomen soft, no tenderness, not distended, normal bowel sounds  Musculoskeletal: No cyanosis in digits, neck supple  Neurology: CN 2-12 grossly intact. No speech or motor deficits  Psych: Normal affect. Alert and oriented in time, place and person  Skin: Warm, dry, normal turgor  Extremity exam shows brisk capillary refill.   Peripheral pulses are palpable in lower extremities     Labs and Tests:  CBC:   Recent Labs     09/21/21  0450   WBC 3.0*   HGB 15.2        BMP:    Recent Labs     09/20/21  2320 09/22/21  0443 09/23/21  0502   * 138 138   K 3.7 4.0 4.0   CL 98* 105 105   CO2 18* 20* 23   BUN 22* 26* 29*   CREATININE 1.1 1.0 1.0   GLUCOSE 198* 150* 144*     Hepatic:   Recent Labs     09/21/21  0450 09/22/21  0443 09/23/21  0502   AST 49* 43* 34   ALT 20 19 19   BILITOT 0.8 0.7 0.7   ALKPHOS 63 56 55     VL Extremity Venous Bilateral   Final Result      CTA PULMONARY W CONTRAST   Final Result   1. No evidence of pulmonary embolus. 2. Diffuse bilateral ground glass pulmonary opacities representing a classic appearance for COVID pneumonia. XR CHEST PORTABLE   Final Result      Coarse bilateral basilar predominant consolidation is compatible with atypical/viral pneumonia in the proper clinical context. Elevated right hemidiaphragm. Normal cardiomediastinal silhouette.         Sandra Rios MD   9/23/2021 12:24 PM

## 2021-09-23 NOTE — PROGRESS NOTES
atraumatic. Nose: Nose normal.      Mouth/Throat:      Mouth: Mucous membranes are moist.   Eyes:      Extraocular Movements: Extraocular movements intact. Conjunctiva/sclera: Conjunctivae normal.      Pupils: Pupils are equal, round, and reactive to light. Cardiovascular:      Rate and Rhythm: Normal rate and regular rhythm. Pulses: Normal pulses. Heart sounds: Normal heart sounds. Pulmonary:      Effort: Pulmonary effort is normal.      Comments: Bibasilar crackle present on auscultation, On 8L of HFNC  Abdominal:      Palpations: Abdomen is soft. Musculoskeletal:         General: Normal range of motion. Cervical back: Normal range of motion and neck supple. Neurological:      General: No focal deficit present. Mental Status: He is alert and oriented to person, place, and time. Labs & Imaging:   LABS:  Renal:   Recent Labs     09/20/21  2320 09/22/21  0443 09/23/21  0502   * 138 138   K 3.7 4.0 4.0   CL 98* 105 105   CO2 18* 20* 23   BUN 22* 26* 29*   CREATININE 1.1 1.0 1.0   GLUCOSE 198* 150* 144*   ANIONGAP 15 13 10     CBC:   Recent Labs     09/20/21  1020 09/21/21  0450   WBC 4.5 3.0*   HGB 15.1 15.2   HCT 43.7 44.4    192   MCV 92.8 92.7                            Hepatic:   Recent Labs     09/21/21  0450 09/22/21  0443 09/23/21  0502   AST 49* 43* 34   ALT 20 19 19   BILITOT 0.8 0.7 0.7   ALKPHOS 63 56 55     Troponin: No results for input(s): TROPONINI in the last 72 hours. BNP: No results for input(s): BNP in the last 72 hours. Lipids: No results for input(s): CHOL, HDL in the last 72 hours. Invalid input(s): LDLCALCU, TRIGLYCERIDE  INR: No results for input(s): INR in the last 72 hours. Lactate: No results for input(s): LACTATE in the last 72 hours. ABGs:No results for input(s): PHART, KCA5HDX, PO2ART, JZU9DKX, BEART, THGBART, D1DPKPUW, YHA4FCU in the last 72 hours.     UA:No results for input(s): NITRITE, COLORU, PHUR, LABCAST, [address], [address] [facility], MUCUS, TRICHOMONAS, YEAST, BACTERIA, CLARITYU, SPECGRAV, LEUKOCYTESUR, UROBILINOGEN, BILIRUBINUR, BLOODU, GLUCOSEU, AMORPHOUS in the last 72 hours. Invalid input(s): Shahrzad Mayo     IMAGING:  VL Extremity Venous Bilateral   Final Result      CTA PULMONARY W CONTRAST   Final Result   1. No evidence of pulmonary embolus. 2. Diffuse bilateral ground glass pulmonary opacities representing a classic appearance for COVID pneumonia. XR CHEST PORTABLE   Final Result      Coarse bilateral basilar predominant consolidation is compatible with atypical/viral pneumonia in the proper clinical context. Elevated right hemidiaphragm. Normal cardiomediastinal silhouette. Assessment & Plan:    Iris Wooten is a 60 y.o. male with PMH of BPH s/p TRUP who was admitted for shortness of breath. Acute hypoxic respiratory failure:  - 2/2 to most likely Covid pneumonia. - Pt presented with SOB, fatigue, decrease in appetite. He did not receive Covid shot. - In ED, T 98.3F, RR 20, , /91, SpO2 84% on room air. Currently, Patient is on 8L of HFNC with SpO2 of low 90s this morning. His oxygen requirement is improving. Pt was on 14L of HFNC yesterday morning.   - Chest x ray showed coarse bilateral basilar predominant consolidation is compatible with atypical/viral pneumonia. - Covid test positive. - Strep pneumo antigen and Legionella antigen negative  - Respiratory culture pending  - D Dimer 5150--> 1045 ---> 945  - Chest CT showed diffuse bilateral ground glass pulmonary opacities representing a classic appearance for COVID pneumonia. - Continue IV Dexamethasone 20 mg daily for 5 days (day4).  Then, dexamethasone 10 mg for 5 days.   - S/p Remdesivir 200 mg once   - Baricitinib 4 mg  - Remdesivir 100 mg for 4 days (day 3)  -  Daily D Dimer and CBC     Elevated D Dimer:  - CT chest negative for PE  - Doppler U/S of LE showed no evidence of deep or superficial venous thrombosis   - D Dimer trending downward. - Daily D Dimer     Code Status: Full Code  ADULT DIET; Regular  PPX: Protonix; Lovenox         This patient will be discussed with attending, Dr. Allison Khan MD.    Tank Cortez MD, PGY- 2  Contact via [facility]  9/23/2021,  8:16 AM    Patient seen, examined and discussed with the resident and I agree with the assessment and plan. Patient is improving other because of or despite the treatments that were getting him. He is on day 3 of remdesivir, day 4 of Decadron and a 3 of baricitinib. His oxygen requirement has come down to 8 L. He still has diffuse crackles bilaterally consistent with his COVID-19 pneumonia. I will continue the current therapies and hopefully continue to wean the oxygen. If he gets down to a low enough flow of oxygen I think we could transition the dexamethasone back to the 6 mg/day dose for him to complete a 10-day course as an outpatient. He does not need to finish baricitinib as an outpatient.     Jovita Barber MD

## 2021-09-24 LAB
ALBUMIN SERPL-MCNC: 3 G/DL (ref 3.4–5)
ALP BLD-CCNC: 55 U/L (ref 40–129)
ALT SERPL-CCNC: 32 U/L (ref 10–40)
ANION GAP SERPL CALCULATED.3IONS-SCNC: 11 MMOL/L (ref 3–16)
AST SERPL-CCNC: 48 U/L (ref 15–37)
BILIRUB SERPL-MCNC: 0.7 MG/DL (ref 0–1)
BILIRUBIN DIRECT: 0.3 MG/DL (ref 0–0.3)
BILIRUBIN, INDIRECT: 0.4 MG/DL (ref 0–1)
BUN BLDV-MCNC: 28 MG/DL (ref 7–20)
CALCIUM SERPL-MCNC: 7.9 MG/DL (ref 8.3–10.6)
CHLORIDE BLD-SCNC: 106 MMOL/L (ref 99–110)
CO2: 23 MMOL/L (ref 21–32)
CREAT SERPL-MCNC: 1 MG/DL (ref 0.8–1.3)
D DIMER: 815 NG/ML DDU (ref 0–229)
GFR AFRICAN AMERICAN: >60
GFR NON-AFRICAN AMERICAN: >60
GLUCOSE BLD-MCNC: 129 MG/DL (ref 70–99)
GLUCOSE BLD-MCNC: 136 MG/DL (ref 70–99)
GLUCOSE BLD-MCNC: 140 MG/DL (ref 70–99)
GLUCOSE BLD-MCNC: 158 MG/DL (ref 70–99)
GLUCOSE BLD-MCNC: 196 MG/DL (ref 70–99)
PERFORMED ON: ABNORMAL
PHOSPHORUS: 3.6 MG/DL (ref 2.5–4.9)
POTASSIUM SERPL-SCNC: 4.5 MMOL/L (ref 3.5–5.1)
SODIUM BLD-SCNC: 140 MMOL/L (ref 136–145)
TOTAL PROTEIN: 6.6 G/DL (ref 6.4–8.2)

## 2021-09-24 PROCEDURE — 36415 COLL VENOUS BLD VENIPUNCTURE: CPT

## 2021-09-24 PROCEDURE — 80069 RENAL FUNCTION PANEL: CPT

## 2021-09-24 PROCEDURE — 97535 SELF CARE MNGMENT TRAINING: CPT

## 2021-09-24 PROCEDURE — 6370000000 HC RX 637 (ALT 250 FOR IP): Performed by: INTERNAL MEDICINE

## 2021-09-24 PROCEDURE — 85379 FIBRIN DEGRADATION QUANT: CPT

## 2021-09-24 PROCEDURE — 2700000000 HC OXYGEN THERAPY PER DAY

## 2021-09-24 PROCEDURE — 6360000002 HC RX W HCPCS: Performed by: INTERNAL MEDICINE

## 2021-09-24 PROCEDURE — 2580000003 HC RX 258: Performed by: INTERNAL MEDICINE

## 2021-09-24 PROCEDURE — 97161 PT EVAL LOW COMPLEX 20 MIN: CPT

## 2021-09-24 PROCEDURE — 80076 HEPATIC FUNCTION PANEL: CPT

## 2021-09-24 PROCEDURE — 2060000000 HC ICU INTERMEDIATE R&B

## 2021-09-24 PROCEDURE — 97165 OT EVAL LOW COMPLEX 30 MIN: CPT

## 2021-09-24 PROCEDURE — 97116 GAIT TRAINING THERAPY: CPT

## 2021-09-24 PROCEDURE — 99232 SBSQ HOSP IP/OBS MODERATE 35: CPT | Performed by: INTERNAL MEDICINE

## 2021-09-24 PROCEDURE — 2500000003 HC RX 250 WO HCPCS: Performed by: INTERNAL MEDICINE

## 2021-09-24 PROCEDURE — 97530 THERAPEUTIC ACTIVITIES: CPT

## 2021-09-24 PROCEDURE — 94761 N-INVAS EAR/PLS OXIMETRY MLT: CPT

## 2021-09-24 RX ORDER — ASCORBIC ACID 500 MG
500 TABLET ORAL DAILY
Status: DISCONTINUED | OUTPATIENT
Start: 2021-09-24 | End: 2021-09-29 | Stop reason: HOSPADM

## 2021-09-24 RX ORDER — ASCORBIC ACID 250 MG
500 TABLET ORAL DAILY
Qty: 30 TABLET | Refills: 0 | Status: SHIPPED | OUTPATIENT
Start: 2021-09-24

## 2021-09-24 RX ORDER — DEXAMETHASONE SODIUM PHOSPHATE 4 MG/ML
10 INJECTION, SOLUTION INTRA-ARTICULAR; INTRALESIONAL; INTRAMUSCULAR; INTRAVENOUS; SOFT TISSUE DAILY
Status: COMPLETED | OUTPATIENT
Start: 2021-09-25 | End: 2021-09-29

## 2021-09-24 RX ORDER — PREDNISONE 10 MG/1
TABLET ORAL
Qty: 30 TABLET | Refills: 0 | Status: SHIPPED | OUTPATIENT
Start: 2021-09-24

## 2021-09-24 RX ORDER — CHOLECALCIFEROL (VITAMIN D3) 50 MCG
2000 TABLET ORAL DAILY
Qty: 60 TABLET | Refills: 0 | Status: SHIPPED | OUTPATIENT
Start: 2021-09-25 | End: 2021-11-10

## 2021-09-24 RX ORDER — THIAMINE MONONITRATE (VIT B1) 100 MG
100 TABLET ORAL DAILY
Qty: 30 TABLET | Refills: 0 | Status: SHIPPED | OUTPATIENT
Start: 2021-09-25 | End: 2021-10-25

## 2021-09-24 RX ADMIN — GUAIFENESIN SYRUP AND DEXTROMETHORPHAN 5 ML: 100; 10 SYRUP ORAL at 20:45

## 2021-09-24 RX ADMIN — GUAIFENESIN SYRUP AND DEXTROMETHORPHAN 5 ML: 100; 10 SYRUP ORAL at 08:49

## 2021-09-24 RX ADMIN — INSULIN LISPRO 1 UNITS: 100 INJECTION, SOLUTION INTRAVENOUS; SUBCUTANEOUS at 20:45

## 2021-09-24 RX ADMIN — SODIUM CHLORIDE 30 ML: 9 INJECTION, SOLUTION INTRAVENOUS at 20:56

## 2021-09-24 RX ADMIN — SODIUM CHLORIDE 25 ML: 9 INJECTION, SOLUTION INTRAVENOUS at 20:57

## 2021-09-24 RX ADMIN — INSULIN LISPRO 1 UNITS: 100 INJECTION, SOLUTION INTRAVENOUS; SUBCUTANEOUS at 12:26

## 2021-09-24 RX ADMIN — REMDESIVIR 100 MG: 100 INJECTION, POWDER, LYOPHILIZED, FOR SOLUTION INTRAVENOUS at 20:56

## 2021-09-24 RX ADMIN — Medication 2000 UNITS: at 08:49

## 2021-09-24 RX ADMIN — ENOXAPARIN SODIUM 30 MG: 30 INJECTION SUBCUTANEOUS at 20:44

## 2021-09-24 RX ADMIN — Medication 10 ML: at 08:55

## 2021-09-24 RX ADMIN — PANTOPRAZOLE SODIUM 40 MG: 40 TABLET, DELAYED RELEASE ORAL at 06:31

## 2021-09-24 RX ADMIN — GUAIFENESIN SYRUP AND DEXTROMETHORPHAN 5 ML: 100; 10 SYRUP ORAL at 18:12

## 2021-09-24 RX ADMIN — Medication 10 ML: at 20:45

## 2021-09-24 RX ADMIN — BARICITINIB 4 MG: 2 TABLET, FILM COATED ORAL at 08:49

## 2021-09-24 RX ADMIN — GUAIFENESIN SYRUP AND DEXTROMETHORPHAN 5 ML: 100; 10 SYRUP ORAL at 12:28

## 2021-09-24 RX ADMIN — ENOXAPARIN SODIUM 30 MG: 30 INJECTION SUBCUTANEOUS at 08:49

## 2021-09-24 RX ADMIN — DEXAMETHASONE SODIUM PHOSPHATE 20 MG: 4 INJECTION, SOLUTION INTRAMUSCULAR; INTRAVENOUS at 20:58

## 2021-09-24 RX ADMIN — Medication 100 MG: at 08:49

## 2021-09-24 ASSESSMENT — PAIN SCALES - GENERAL
PAINLEVEL_OUTOF10: 0

## 2021-09-24 NOTE — CARE COORDINATION
Case Management Assessment           Daily Note                 Date/ Time of Note: 9/24/2021 3:53 PM         Note completed by: Kanwal Clark RN    Patient Name: Sary Bee  YOB: 1955    Diagnosis:Pneumonia due to COVID-19 virus [U07.1, J12.82]  COVID-19 [U07.1]  Patient Admission Status: Inpatient    Date of Admission:9/20/2021  9:51 AM Length of Stay: 4 GLOS:      Current Plan of Care: weaning O2, IV steroids, Remdesivir  ________________________________________________________________________________________  PT AM-PAC: 17 / 24 per last evaluation on: 09/24/21     OT AM-PAC: 18 / 24 per last evaluation on: 09/24/21     DME Needs for discharge: walker, bath bench and home oxygen  ________________________________________________________________________________________  Discharge Plan: Home with [address]: [facility] following (ruby visits)    Tentative discharge date: TBD    Current barriers to discharge: weaning O2, medical stability and clearance for DC, NO INSURANCE    Referrals completed: DME: [facility] and [address] [place] [address] Way: [facility]    Resources/ information provided: Not indicated at this time  ________________________________________________________________________________________  Case Management Notes: CM continues to follow for DC planning and needs. CM attempted to reach patient x2, first time patient was working with therapy and asked for CM to call back later. CM attempted to reach patient again this afternoon and no answer on room phone. Patient had home O2 eval done today and needs 5L O2 at rest and 8L with ambulation, per nursing she spoke with patient in regards to home O2 cost as patient does not have insurance and patient reports that he cannot afford $150 per month, he has no money on him and cannot pay for his meds in OP pharmacy. CM able to provide voucher for home meds today once ready in case patient to DC over the weekend.  Lui Bradley with [facility] working on setting up home oxygen for the patient at discharge in case ready over there weekend. CM will continue to try and reach him in regards to home O2 cost, patient will also need additional DME at DC, to be determined by therapy prior to DC. Ritu Mcdonough and his family were provided with choice of provider; he and his family are in agreement with the discharge plan.     Care Transition Patient: Padmini Davis RN  [facility]  Case Management Department  Ph: [phone]  Fax: [phone]

## 2021-09-24 NOTE — PROGRESS NOTES
Hospitalist Progress Note      PCP: No primary care provider on file. Date of Admission: 9/20/2021    Chief Complaint: [facility] Course: Patient is admitted with COVID 19 at the end of first week since symptoms onset (9/15).    Currently patient is on 14 liters O2, up from initial 3 in ER. He denies any subjective worsening of his breathing. He is optimistic about his recovery. No chest pain. Tolerating PO well. Urinating. Patient denies need to update his family/friends.      Subjective: feeling much better, still on 5 liter oxygen but over all feeling good       Medications:  Reviewed    Infusion Medications    sodium chloride 25 mL (09/23/21 2034)    dextrose       Scheduled Medications    [START ON 9/25/2021] dexamethasone  10 mg IntraVENous Daily    baricitinib  4 mg Oral Daily    sodium chloride flush  5-40 mL IntraVENous 2 times per day    enoxaparin  30 mg SubCUTAneous BID    guaiFENesin-dextromethorphan  5 mL Oral 4x Daily    Vitamin D  2,000 Units Oral Daily    CENTRUM/CERTA-ISSA with minerals oral  15 mL Oral Daily    thiamine mononitrate  100 mg Oral Daily    dexamethasone  20 mg IntraVENous Q24H    remdesivir IVPB  100 mg IntraVENous Q24H    insulin lispro  0-6 Units SubCUTAneous TID WC    insulin lispro  0-3 Units SubCUTAneous Nightly    pantoprazole  40 mg Oral QAM AC     PRN Meds: sodium chloride flush, sodium chloride, ondansetron **OR** ondansetron, polyethylene glycol, acetaminophen **OR** acetaminophen, sodium chloride, glucose, dextrose, glucagon (rDNA), dextrose      Intake/Output Summary (Last 24 hours) at 9/24/2021 1247  Last data filed at 9/24/2021 1100  Gross per 24 hour   Intake 440 ml   Output 575 ml   Net -135 ml       Physical Exam Performed:    /88   Pulse 78   Temp 97.7 °F (36.5 °C) (Oral)   Resp 20   Ht 5' 6\" (1.676 m)   Wt 167 lb (75.8 kg)   SpO2 92%   BMI 26.95 kg/m²     General appearance: No apparent distress, appears stated age and cooperative. HEENT: Pupils equal, round, and reactive to light. Conjunctivae/corneas clear. Neck: Supple, with full range of motion. No jugular venous distention. Trachea midline. Respiratory:  Normal respiratory effort. Clear to auscultation, bilaterally without Rales/Wheezes/Rhonchi. Cardiovascular: Regular rate and rhythm with normal S1/S2 without murmurs, rubs or gallops. Abdomen: Soft, non-tender, non-distended with normal bowel sounds. Musculoskeletal: No clubbing, cyanosis or edema bilaterally. Full range of motion without deformity. Skin: Skin color, texture, turgor normal.  No rashes or lesions. Neurologic:  Neurovascularly intact without any focal sensory/motor deficits. Cranial nerves: II-XII intact, grossly non-focal.  Psychiatric: Alert and oriented, thought content appropriate, normal insight  Capillary Refill: Brisk,3 seconds, normal   Peripheral Pulses: +2 palpable, equal bilaterally       Labs:   No results for input(s): WBC, HGB, HCT, PLT in the last 72 hours. Recent Labs     09/22/21  0443 09/23/21  0502 09/24/21  0525    138 140   K 4.0 4.0 4.5    105 106   CO2 20* 23 23   BUN 26* 29* 28*   CREATININE 1.0 1.0 1.0   CALCIUM 8.1* 7.9* 7.9*   PHOS 3.8 3.7 3.6     Recent Labs     09/22/21  0443 09/23/21  0502 09/24/21  0525   AST 43* 34 48*   ALT 19 19 32   BILIDIR <0.2 0.3 0.3   BILITOT 0.7 0.7 0.7   ALKPHOS 56 55 55     No results for input(s): INR in the last 72 hours. No results for input(s): Ardelle Chalk in the last 72 hours. Urinalysis:      Lab Results   Component Value Date    NITRU POSITIVE 02/06/2018    WBCUA see below 02/06/2018    BACTERIA 2+ 01/15/2018    RBCUA >100 02/06/2018    BLOODU LARGE 02/06/2018    SPECGRAV <=1.005 02/06/2018    GLUCOSEU Negative 02/06/2018       Radiology:  VL Extremity Venous Bilateral   Final Result      CTA PULMONARY W CONTRAST   Final Result   1. No evidence of pulmonary embolus.    2. Diffuse bilateral ground glass pulmonary opacities representing a classic appearance for COVID pneumonia. XR CHEST PORTABLE   Final Result      Coarse bilateral basilar predominant consolidation is compatible with atypical/viral pneumonia in the proper clinical context. Elevated right hemidiaphragm. Normal cardiomediastinal silhouette. Assessment/Plan:    Active Hospital Problems    Diagnosis     Pneumonia due to COVID-19 virus [U07.1, J12.82]         COVID 19 pneumonia with acute hypoxic resp failure:  Procal, legionella, strep AG negative  CRP moderately elevated at 43  Oxygen requirements are escalating rapidly   Cont with high dose decadron, remdesivir and baricitinib  Consult pulmonology   - can probably discharge over the weekend  - home O2 evaluation     Elevated Ddimer:  CTA chest negative for PE  Venous doppler negative  Cont with lovenox prophy dose      BPH:  Monitor for urinary retention     Hyponatremia:  Improved with IVF       DVT Prophylaxis: lovenox  Diet: ADULT DIET;  Regular  Code Status: Full Code    PT/OT Eval Status: on going    Dispo - [place]    Dieudonne Gage DO

## 2021-09-24 NOTE — PROGRESS NOTES
Occupational Therapy   Occupational Therapy Initial Assessment and Treatment    Date: 2021   Patient Name: Jemma Brantley  MRN: [id number]     : 1955    Date of Service: 2021    Discharge Recommendations:  Jemma Brantley scored a 18/24 on the AM-PAC ADL Inpatient form. Current research shows that an AM-PAC score of 17 or less is typically not associated with a discharge to the patient's home setting. Based on the patient's AM-PAC score and their current ADL deficits, it is recommended that the patient have 3-5 sessions per week of Occupational Therapy at d/c to increase the patient's independence. Please see assessment section for further patient specific details. If patient discharges prior to next session this note will serve as a discharge summary. Please see below for the latest assessment towards goals. OT Equipment Recommendations  Equipment Needed: Yes  Mobility Devices: ADL Assistive Devices  ADL Assistive Devices: Shower Chair with back; Hand-held Shower    Assessment   Performance deficits / Impairments: Decreased functional mobility ; Decreased ADL status; Decreased endurance;Decreased strength  Assessment: Functioning well-below independent baseline. Lives alone and is normally independent w/ ADLs, ambulation, and IADLs. Currently, pt is RILEY on min exertion -wearing 5L O2 per NC. Able to amb to/from bathroom (~10' but requiring lengthy seated rest break for O2 sat recovery). There are concerns for return home alone - states he has neighbors. If home, recommend home OT, shower chair w/ back, hand held shower. Anticipate pt will require assitance for IADLs. Pt will benefit from ongoing OT. Treatment Diagnosis: impaired ADLs/transfers and decreased functional activity tolerance 2* COVID  OT Education: OT Role;Plan of Care;Transfer Training;Energy Conservation; ADL Adaptive Strategies  Patient Education: would benefit from ongoing education; ? full comprehension of education - decreaesd carryover of PLB instructions despite verbal cues/demonstration  REQUIRES OT FOLLOW UP: Yes  Activity Tolerance  Activity Tolerance: Treatment limited secondary to medical complications (free text); Patient limited by fatigue  Activity Tolerance: pt appearing weak, shaky, unsteady; SOB on min exertion - wearing 5L O2 per NC. O2 sats fluctuating 88-low 90s. Safety Devices  Safety Devices in place: Yes  Type of devices: Nurse notified; Left in chair;Chair alarm in place;Call light within reach           Patient Diagnosis(es): The encounter diagnosis was COVID-19.     has a past medical history of BPH (benign prostatic hyperplasia). has a past surgical history that includes TURP (02/07/2018). Treatment Diagnosis: impaired ADLs/transfers and decreased functional activity tolerance 2* COVID      Restrictions  Position Activity Restriction  Other position/activity restrictions: up as tolerated       Subjective   General  Chart Reviewed: Yes  Additional Pertinent Hx: 60 y.o. M to ED w/ c/o SOB. Hospital course: CTA Pulm: representing a classic appearance for COVID pneumonia; LE dopplers: neg; COVID (-). PMH: BPH, TURP. Family / Caregiver Present: No  Referring Practitioner: Dr. Beto Blue  Diagnosis: Pneumonia due to COVID      Subjective  Subjective: In bed on entry. Agreeable to OT w/ encouragement. States his plans are to return home - does not have much of a support system. \"I have neighbors that can help. \"      Patient Currently in Pain: Denies      Social/Functional History  Social/Functional History  Lives With: Alone  Type of Home: Mobile home  Home Layout: One level  Home Access: Stairs to enter with rails (4-5 PETER)  Bathroom Shower/Tub: Tub/Shower unit  Bathroom Toilet: Standard  Bathroom Equipment:  (none)  Home Equipment:  (none)  ADL Assistance: Independent  Homemaking Assistance: Independent  Ambulation Assistance: Independent  Transfer Assistance: Independent  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  Additional Comments: Denies falls       Objective   Vision: Within Functional Limits (reading glasses)  Hearing: Within functional limits      Orientation  Overall Orientation Status: Within Functional Limits     Observation/Palpation  Observation: unkempt; dirty socks, debris under fingernails, hair oily     Balance  Sitting Balance: Stand by assistance  Standing Balance: Contact guard assistance    Functional Mobility  Functional - Mobility Device: No device  Activity: To/from bathroom  Assist Level: Contact guard assistance  Functional Mobility Comments: Note: shaky, slightly unsteady, no overt LOB    Toilet Transfers  Toilet - Technique: Ambulating  Equipment Used: Standard toilet (w/ bar)  Toilet Transfer: Contact guard assistance    ADL  LE Dressing: Stand by assistance (doff socks and don clean socks - effortful, cues for modified technique; O2 sats drop to 88%)  Additional Comments: Note: declining all options for ADLs     Coordination  Movements Are Fluid And Coordinated: Yes        Bed mobility  Supine to Sit: Stand by assistance  Scooting: Stand by assistance     Transfers  Sit to stand: Contact guard assistance  Stand to sit: Contact guard assistance        Cognition  Overall Cognitive Status: Exceptions  Following Commands: Follows one step commands with repetition  Attention Span: Difficulty attending to directions (at times; repeated directions for PLB - not returning demonstration despite cues and demo)  Memory: Appears intact  Safety Judgement: Decreased awareness of need for safety  Insights: Decreased awareness of deficits  Initiation: Does not require cues  Sequencing: Does not require cues  Cognition Comment: decreased awareness of his deficits; decreased insight - feels he will be safe at home alone (RILEY on min exertion - 10' mobility w/ desaturation); Shakes his head 'yes' to many questions - ? full comprehension of topic of conversation. LUE Strength  Gross LUE Strength: WFL  RUE Strength  Gross RUE Strength: WFL               Pt seen by OT for eval and treat.  Treatment included: bed mobility, functional transfer/mobility, ADL, pt education         Plan   Plan  Times per week: 2-5  Times per day: Daily  Current Treatment Recommendations: Balance Training, Functional Mobility Training, Endurance Training, Safety Education & Training, Equipment Evaluation, Education, & procurement, Self-Care / ADL                                                    AM-PAC Score        AM-PAC Inpatient Daily Activity Raw Score: 18 (09/24/21 1503)  AM-PAC Inpatient ADL T-Scale Score : 38.66 (09/24/21 1503)  ADL Inpatient CMS 0-100% Score: 46.65 (09/24/21 1503)  ADL Inpatient CMS G-Code Modifier : CK (09/24/21 1503)    Goals  Short term goals  Time Frame for Short term goals: Discharge  Short term goal 1: toilet transfer w/ Supervision  Short term goal 2: functional mobility for item retrieval, SBA  Short term goal 3: LB dressing w/ Supervision  Short term goal 4: verbalize 3 energy conservation techniques to utilize at home  Short term goal 5: demonstrate PLB technique during ADLs w/ no cues  Patient Goals   Patient goals : to return home       Therapy Time   Individual Concurrent Group Co-treatment   Time In 1415         Time Out 1458         Minutes 43           Timed Code Treatment Minutes:   28    Total Treatment Minutes:  4900 jB Cortez OTR/L #[id number]

## 2021-09-24 NOTE — PROGRESS NOTES
09/24/21 1254   Resting (Room Air)   SpO2 81   HR 85   Resting (On O2)   SpO2 90   HR 91   O2 Device Nasal cannula   O2 Flow Rate (l/min) 5 l/min   Comments Patient needed 5L to maintain spO2 above 88% consistently. During Walk (Room Air)   Walk/Assistance Device [place] [employer]   Comments Did not walk patient on room air due to shortness of breath and spO2 drop while moving in bed/standing   During Walk (On O2)   SpO2 80   HR 71   O2 Device Nasal cannula   O2 Flow Rate (l/min) 5 l/min   Need Additional O2 Flow Rate Rows Yes   O2 Flow Rate (l/min) 7 l/min   O2 Saturation 86-87%   O2 Flow Rate (l/min) 8 l/min   O2 Saturation 90%   Walk/Assistance Device Walker   Rate of Dyspnea 2   Symptoms Shortness of breath; Fatigue   Comments Patient needed 8L nasal cannula to maintain above 88%. Needed walker for ambulation, but normally doesn't use a walker or cane.    After Walk   SpO2 91   HR 89   O2 Device Nasal cannula   O2 Flow Rate (l/min) 5 l/min   Rate of Dyspnea 1   Symptoms Fatigue   Does the Patient Qualify for Home O2 Yes   Liter Flow at Rest 5   Liter Flow on Exertion 8   Does the Patient Need Portable Oxygen Tanks Yes

## 2021-09-24 NOTE — PLAN OF CARE
Problem: Gas Exchange - Impaired  Goal: Absence of hypoxia  Outcome: Ongoing   Pt is currently on 7L high flow nasal cannula. Avasys camera in in pt's room to monitor pt's SpO2 level. Problem: Body Temperature -  Risk of, Imbalanced  Goal: Will regain or maintain usual level of consciousness  Outcome: Ongoing  Pt is alert and oriented x4 and follows commands appropriately. Goal: Complications related to the disease process, condition or treatment will be avoided or minimized  Outcome: Ongoing   Pt is on Remdesivir and decadron for treatment of COVID. Problem: Isolation Precautions - Risk of Spread of Infection  Goal: Prevent transmission of infection  Outcome: Ongoing   Pt is in droplet plus precautions r/t positive COVID test.    Problem: Risk for Fluid Volume Deficit  Goal: Maintain normal heart rhythm  Outcome: Ongoing   Pt is on continuous telemetry and heart rhythm is NSR. Problem: Falls - Risk of:  Goal: Will remain free from falls  Description: Will remain free from falls  Outcome: Ongoing   Pt's bed is in lowest position, brake and bed exit alarm are on, call light and bedside table are within reach. Problem: Serum Glucose Level - Abnormal:  Goal: Ability to maintain appropriate glucose levels will improve  Description: Ability to maintain appropriate glucose levels will improve  Outcome: Ongoing   Pt's blood sugar is being checked ACHS and is being covered with insulin lispro.

## 2021-09-24 NOTE — PROGRESS NOTES
Pulmonology Progress Note  PGY-2    Admit Date: 9/20/2021  Hospital Day: 5  Diet: ADULT DIET; Regular  Code Status: Full Code       Interval history:  Patient was seen and examined this morning. Pt remained afebrile overnight. He is on 6L of HFNC this morning. Pt mentions that his breathing is much better than when he first presented to the hospital. Endorses non productive cough. Denies fevers, chills, chest pain, shortness of breath, nausea, vomiting, diarrhea, or constipation. Medications:   Scheduled Meds:   [START ON 9/26/2021] dexamethasone  10 mg IntraVENous Daily    baricitinib  4 mg Oral Daily    sodium chloride flush  5-40 mL IntraVENous 2 times per day    enoxaparin  30 mg SubCUTAneous BID    guaiFENesin-dextromethorphan  5 mL Oral 4x Daily    Vitamin D  2,000 Units Oral Daily    CENTRUM/CERTA-ISSA with minerals oral  15 mL Oral Daily    thiamine mononitrate  100 mg Oral Daily    dexamethasone  20 mg IntraVENous Q24H    remdesivir IVPB  100 mg IntraVENous Q24H    insulin lispro  0-6 Units SubCUTAneous TID WC    insulin lispro  0-3 Units SubCUTAneous Nightly    pantoprazole  40 mg Oral QAM AC       Continuous Infusions:   sodium chloride 25 mL (09/23/21 2034)    dextrose         PRN Meds:  sodium chloride flush, sodium chloride, ondansetron **OR** ondansetron, polyethylene glycol, acetaminophen **OR** acetaminophen, sodium chloride, glucose, dextrose, glucagon (rDNA), dextrose    Vital/I&O/Physical examination:   VS:  BP (!) 166/83   Pulse 76   Temp 97.8 °F (36.6 °C) (Oral)   Resp 18   Ht 5' 6\" (1.676 m)   Wt 167 lb (75.8 kg)   SpO2 93%   BMI 26.95 kg/m²     I/O:    Intake/Output Summary (Last 24 hours) at 9/24/2021 0858  Last data filed at 9/24/2021 0600  Gross per 24 hour   Intake 200 ml   Output 300 ml   Net -100 ml       PE:  Physical Exam  Vitals reviewed. Constitutional:       Appearance: Normal appearance. HENT:      Head: Normocephalic and atraumatic.       Nose: Nose normal.      Mouth/Throat:      Mouth: Mucous membranes are moist.   Eyes:      Extraocular Movements: Extraocular movements intact. Conjunctiva/sclera: Conjunctivae normal.      Pupils: Pupils are equal, round, and reactive to light. Cardiovascular:      Rate and Rhythm: Normal rate and regular rhythm. Pulses: Normal pulses. Heart sounds: Normal heart sounds. Pulmonary:      Effort: Pulmonary effort is normal.      Comments: Bibasilar crackle present on auscultation, On 6L of HFNC  Abdominal:      Palpations: Abdomen is soft. Musculoskeletal:         General: Normal range of motion. Cervical back: Normal range of motion and neck supple. Neurological:      General: No focal deficit present. Mental Status: He is alert and oriented to person, place, and time. Labs & Imaging:   LABS:  Renal:   Recent Labs     09/22/21 0443 09/23/21  0502 09/24/21  0525    138 140   K 4.0 4.0 4.5    105 106   CO2 20* 23 23   BUN 26* 29* 28*   CREATININE 1.0 1.0 1.0   GLUCOSE 150* 144* 158*   ANIONGAP 13 10 11     CBC:   No results for input(s): WBC, HGB, HCT, PLT, MCV in the last 72 hours. Hepatic:   Recent Labs     09/22/21 0443 09/23/21  0502 09/24/21  0525   AST 43* 34 48*   ALT 19 19 32   BILITOT 0.7 0.7 0.7   ALKPHOS 56 55 55     Troponin: No results for input(s): TROPONINI in the last 72 hours. BNP: No results for input(s): BNP in the last 72 hours. Lipids: No results for input(s): CHOL, HDL in the last 72 hours. Invalid input(s): LDLCALCU, TRIGLYCERIDE  INR: No results for input(s): INR in the last 72 hours. Lactate: No results for input(s): LACTATE in the last 72 hours. ABGs:No results for input(s): PHART, EYR7UOC, PO2ART, JQD0UMX, BEART, THGBART, B3IZWKXT, RTS8YLL in the last 72 hours.     UA:No results for input(s): NITRITE, COLORU, PHUR, LABCAST, WBCUA, RBCUA, MUCUS, TRICHOMONAS, YEAST, BACTERIA, CLARITYU, SPECGRAV, LEUKOCYTESUR, UROBILINOGEN, BILIRUBINUR, BLOODU, GLUCOSEU, AMORPHOUS in the last 72 hours. Invalid input(s): Bryson Distance     IMAGING:  VL Extremity Venous Bilateral   Final Result      CTA PULMONARY W CONTRAST   Final Result   1. No evidence of pulmonary embolus. 2. Diffuse bilateral ground glass pulmonary opacities representing a classic appearance for COVID pneumonia. XR CHEST PORTABLE   Final Result      Coarse bilateral basilar predominant consolidation is compatible with atypical/viral pneumonia in the proper clinical context. Elevated right hemidiaphragm. Normal cardiomediastinal silhouette. Assessment & Plan:    Joy Polanco is a 60 y.o. male with PMH of BPH s/p TRUP who was admitted for shortness of breath. Acute hypoxic respiratory failure:  - 2/2 to most likely Covid pneumonia. - Pt presented with SOB, fatigue, decrease in appetite. He did not receive Covid shot. - In ED, T 98.3F, RR 20, , /91, SpO2 84% on room air. Currently, Patient is on 8L of HFNC with SpO2 of low 90s this morning. His oxygen requirement is improving. Pt was on 14L of HFNC yesterday morning.   - Chest x ray showed coarse bilateral basilar predominant consolidation is compatible with atypical/viral pneumonia. - Covid test positive. - Strep pneumo antigen and Legionella antigen negative  - Respiratory culture pending  - D Dimer 5150--> 1045 ---> 945  - Chest CT showed diffuse bilateral ground glass pulmonary opacities representing a classic appearance for COVID pneumonia. - Continue IV Dexamethasone 20 mg daily for 5 days (day5).  Then, dexamethasone 10 mg for 5 days.   - S/p Remdesivir 200 mg once   - Baricitinib 4 mg  - Remdesivir 100 mg for 4 days (day 4)  - If he gets down to a low enough flow oxygen, transition the dexamethasone back to 6 mg daily to complete a 10-day course as an outpatient  - He does not need to finish baricitinib as an outpatient.       Elevated D Dimer:  - CT chest negative for PE  - Doppler U/S of LE showed no evidence of deep or superficial venous thrombosis       Code Status: Full Code  ADULT DIET; Regular  PPX: Protonix; Lovenox       This patient will be discussed with attending, Dr. Ad Portillo MD.    Radha Connelly MD, PGY- 2  Contact via [facility]  9/24/2021,  8:58 AM     Patient seen, examined and discussed with the resident and I agree with the assessment and plan. Oxygen requirements continue to improve and he is down to 5 L of oxygen at rest.  He did a home O2 evaluation today and his oxygen requirements went up to 8 L. He is on day 5 of 5 of remdesivir  Day 4 of 14 of baricitinib, although if he gets discharged in the baricitinib can stop. He is on Decadron which I think can be dropped to the 6 mg dose to complete a 10-day course at discharge. He is close to being able to discharge home on supplemental O2, but if it is going to take an extended period like another week or so he may be better served going to a skilled nursing facility for continued oxygen weaning. From pulmonary standpoint there are no changes in management at this time and he appears to be on the road to recovery. We will sign off, but please call if he deteriorates unexpectedly.       Kimmie Dorado MD

## 2021-09-24 NOTE — PLAN OF CARE
Problem: Pain:  Goal: Pain level will decrease  Description: Pain level will decrease  Outcome: Ongoing  Note: Pt complaining of 0/10 pain. Will continue to monitor. Problem: Airway Clearance - Ineffective  Goal: Achieve or maintain patent airway  Outcome: Ongoing     Problem: Gas Exchange - Impaired  Goal: Absence of hypoxia  9/24/2021 1207 by Jeanmarie Manuel RN  Outcome: Ongoing  Note: Pt SpO2 >88 % will continue to wean as tolerated. Pt weaned from 6 L to 5 L,     Problem: Body Temperature -  Risk of, Imbalanced  Goal: Ability to maintain a body temperature within defined limits  Outcome: Ongoing  Note: Pt remaining afbrile thus far this shift.

## 2021-09-24 NOTE — PROGRESS NOTES
Physical Therapy    Facility/Department: [place] [facility] [address] [facility]  Initial Assessment and Treatment    NAME: Tory Bay  : 1955  MRN: [id number]    Date of Service: 2021    Discharge Recommendations:    Tory Bay scored a 17/24 on the AM-PAC short mobility form. Current research shows that an AM-PAC score of 17 or less is typically not associated with a discharge to the patient's home setting. Based on the patient's AM-PAC score and their current functional mobility deficits, it is recommended that the patient have 3-5 sessions per week of Physical Therapy at d/c to increase the patient's independence. Please see assessment section for further patient specific details. If patient discharges prior to next session this note will serve as a discharge summary. Please see below for the latest assessment towards goals. PT Equipment Recommendations  Equipment Needed:  (cont to assess)    Assessment   Body structures, Functions, Activity limitations: Decreased functional mobility ; Decreased endurance  Assessment: Pt with decreased independent mobility from baseline. Pt reports normally independent with mobility without AD. Currently with poor activity tolerance and slightly unsteady. Needing CGA for safety. Desats with minimal activity and needs several minutes to recover. Safety concerns with pt returning home alone. Pt with poor insight into deficits. Rec cont skilled PT to maximize mobility and independence. Treatment Diagnosis: impaired functional mobility 2/2 COVID pneumonia  Decision Making: Low Complexity  PT Education: PT Role;Plan of Care;Goals; General Safety; Functional Mobility Training  Patient Education: Pursed lip breathing - pt will need reinforcement  REQUIRES PT FOLLOW UP: Yes       Patient Diagnosis(es): The encounter diagnosis was COVID-19.     has a past medical history of BPH (benign prostatic hyperplasia).    has a past surgical history that includes TURP (02/07/2018). Restrictions  Position Activity Restriction  Other position/activity restrictions: up as tolerated  Vision/Hearing  Vision: Within Functional Limits (reading glasses)  Hearing: Within functional limits     Subjective  General  Chart Reviewed: Yes  Additional Pertinent Hx: Pt is a 60 y.o. male adm 9/20 with pneumonia 2/2 COVID. Pt presented to ED after testing positive for Covid on 9/18/2021 at urgent care. Patient has had symptoms since approximately 1 week ago. He has had significant fatigue and loss of appetite. Now has worsening shortness of breath - pulse ox 84% in ED. CXR:Coarse bilateral basilar predominant consolidation is compatible with atypical/viral pneumonia. Chest CT: neg PE.  LE dopplers: neg. PMH: BPH, TURP  Referring Practitioner: Ashlee Farley DO  Referral Date : 09/24/21  Subjective  Subjective: Pt found supine. Agreeable to PT.   Pain Screening  Patient Currently in Pain: Denies  Vital Signs  Patient Currently in Pain: Denies       Orientation  Orientation  Overall Orientation Status: Within Functional Limits  Social/Functional History  Social/Functional History  Lives With: Alone  Type of Home: Mobile home  Home Layout: One level  Home Access: Stairs to enter with rails (4-5 PETER)  Bathroom Shower/Tub: Tub/Shower unit  Bathroom Toilet: Standard  Bathroom Equipment:  (none)  Home Equipment:  (none)  ADL Assistance: Independent  Homemaking Assistance: Independent  Ambulation Assistance: Independent  Transfer Assistance: Independent  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  Additional Comments: Denies falls  Cognition        Objective          AROM RLE (degrees)  RLE AROM: WFL  AROM LLE (degrees)  LLE AROM : WFL  Strength RLE  Strength RLE: WFL  Strength LLE  Strength LLE: WFL        Bed mobility  Supine to Sit: Stand by assistance  Transfers  Sit to Stand: Stand by assistance (from bed and from chair)  Stand to sit: Contact guard assistance  Bed to Chair: Contact guard assistance (stand pivot without AD)  Ambulation  Ambulation?: Yes  Ambulation 1  Device: No Device, O2 5L  Assistance: Contact guard assistance  Quality of Gait: decreased annie, decreased bilat step length/height, slightly unsteady but no LOB, reaches out for UE support, mild RILEY  Distance: 12' x 2  Comments: Pulse ox 87-88% after ambulating to bathroom. Increased to 94% after rest and cues for pursed lip breathing. Sats 90% after walking back to chair.               Plan   Plan  Times per week: 2-5  Current Treatment Recommendations: Functional Mobility Training, Gait Training, Endurance Training, Patient/Caregiver Education & Training, Safety Education & Training  Safety Devices  Type of devices: Call light within reach, Nurse notified, Left in chair, Chair alarm in place    G-Code       OutComes Score                                                  AM-PAC Score  AM-PAC Inpatient Mobility Raw Score : 17 (09/24/21 1439)  AM-PAC Inpatient T-Scale Score : 42.13 (09/24/21 1439)  Mobility Inpatient CMS 0-100% Score: 50.57 (09/24/21 1439)  Mobility Inpatient CMS G-Code Modifier : CK (09/24/21 1439)          Goals  Short term goals  Time Frame for Short term goals: By discharge  Short term goal 1: Sup to sit independent  Short term goal 2: Pt will transfer sit to stand supervision  Short term goal 3: Pt will amb >100' with LRAD supervision       Therapy Time   Individual Concurrent Group Co-treatment   Time In 1408         Time Out 1446         Minutes 38              Timed Code Treatment Minutes: 23      Total Treatment Minutes:  [address], PT

## 2021-09-25 LAB
ALBUMIN SERPL-MCNC: 3 G/DL (ref 3.4–5)
ALP BLD-CCNC: 57 U/L (ref 40–129)
ALT SERPL-CCNC: 40 U/L (ref 10–40)
ANION GAP SERPL CALCULATED.3IONS-SCNC: 13 MMOL/L (ref 3–16)
AST SERPL-CCNC: 48 U/L (ref 15–37)
BILIRUB SERPL-MCNC: 0.7 MG/DL (ref 0–1)
BILIRUBIN DIRECT: <0.2 MG/DL (ref 0–0.3)
BILIRUBIN, INDIRECT: ABNORMAL MG/DL (ref 0–1)
BUN BLDV-MCNC: 28 MG/DL (ref 7–20)
CALCIUM SERPL-MCNC: 7.8 MG/DL (ref 8.3–10.6)
CHLORIDE BLD-SCNC: 106 MMOL/L (ref 99–110)
CO2: 22 MMOL/L (ref 21–32)
CREAT SERPL-MCNC: 1 MG/DL (ref 0.8–1.3)
D DIMER: 2202 NG/ML DDU (ref 0–229)
GFR AFRICAN AMERICAN: >60
GFR NON-AFRICAN AMERICAN: >60
GLUCOSE BLD-MCNC: 128 MG/DL (ref 70–99)
GLUCOSE BLD-MCNC: 133 MG/DL (ref 70–99)
GLUCOSE BLD-MCNC: 160 MG/DL (ref 70–99)
GLUCOSE BLD-MCNC: 172 MG/DL (ref 70–99)
GLUCOSE BLD-MCNC: 193 MG/DL (ref 70–99)
GLUCOSE BLD-MCNC: 195 MG/DL (ref 70–99)
PERFORMED ON: ABNORMAL
PHOSPHORUS: 3.5 MG/DL (ref 2.5–4.9)
POTASSIUM SERPL-SCNC: 4.3 MMOL/L (ref 3.5–5.1)
SODIUM BLD-SCNC: 141 MMOL/L (ref 136–145)
TOTAL PROTEIN: 6.6 G/DL (ref 6.4–8.2)

## 2021-09-25 PROCEDURE — 6360000002 HC RX W HCPCS: Performed by: INTERNAL MEDICINE

## 2021-09-25 PROCEDURE — 6360000002 HC RX W HCPCS: Performed by: STUDENT IN AN ORGANIZED HEALTH CARE EDUCATION/TRAINING PROGRAM

## 2021-09-25 PROCEDURE — 6370000000 HC RX 637 (ALT 250 FOR IP): Performed by: INTERNAL MEDICINE

## 2021-09-25 PROCEDURE — 80069 RENAL FUNCTION PANEL: CPT

## 2021-09-25 PROCEDURE — 85379 FIBRIN DEGRADATION QUANT: CPT

## 2021-09-25 PROCEDURE — 2060000000 HC ICU INTERMEDIATE R&B

## 2021-09-25 PROCEDURE — 2580000003 HC RX 258: Performed by: INTERNAL MEDICINE

## 2021-09-25 PROCEDURE — 80076 HEPATIC FUNCTION PANEL: CPT

## 2021-09-25 PROCEDURE — 94761 N-INVAS EAR/PLS OXIMETRY MLT: CPT

## 2021-09-25 PROCEDURE — 36415 COLL VENOUS BLD VENIPUNCTURE: CPT

## 2021-09-25 PROCEDURE — 2700000000 HC OXYGEN THERAPY PER DAY

## 2021-09-25 RX ORDER — FUROSEMIDE 10 MG/ML
20 INJECTION INTRAMUSCULAR; INTRAVENOUS ONCE
Status: COMPLETED | OUTPATIENT
Start: 2021-09-25 | End: 2021-09-25

## 2021-09-25 RX ADMIN — GUAIFENESIN SYRUP AND DEXTROMETHORPHAN 5 ML: 100; 10 SYRUP ORAL at 21:17

## 2021-09-25 RX ADMIN — GUAIFENESIN SYRUP AND DEXTROMETHORPHAN 5 ML: 100; 10 SYRUP ORAL at 16:39

## 2021-09-25 RX ADMIN — ENOXAPARIN SODIUM 30 MG: 30 INJECTION SUBCUTANEOUS at 09:11

## 2021-09-25 RX ADMIN — FUROSEMIDE 20 MG: 10 INJECTION, SOLUTION INTRAMUSCULAR; INTRAVENOUS at 09:13

## 2021-09-25 RX ADMIN — OXYCODONE HYDROCHLORIDE AND ACETAMINOPHEN 500 MG: 500 TABLET ORAL at 09:12

## 2021-09-25 RX ADMIN — PANTOPRAZOLE SODIUM 40 MG: 40 TABLET, DELAYED RELEASE ORAL at 06:11

## 2021-09-25 RX ADMIN — Medication 100 MG: at 09:12

## 2021-09-25 RX ADMIN — Medication 2000 UNITS: at 09:12

## 2021-09-25 RX ADMIN — BARICITINIB 4 MG: 2 TABLET, FILM COATED ORAL at 09:21

## 2021-09-25 RX ADMIN — INSULIN LISPRO 1 UNITS: 100 INJECTION, SOLUTION INTRAVENOUS; SUBCUTANEOUS at 12:16

## 2021-09-25 RX ADMIN — GUAIFENESIN SYRUP AND DEXTROMETHORPHAN 5 ML: 100; 10 SYRUP ORAL at 12:24

## 2021-09-25 RX ADMIN — INSULIN LISPRO 1 UNITS: 100 INJECTION, SOLUTION INTRAVENOUS; SUBCUTANEOUS at 21:18

## 2021-09-25 RX ADMIN — Medication 10 ML: at 09:14

## 2021-09-25 RX ADMIN — GUAIFENESIN SYRUP AND DEXTROMETHORPHAN 5 ML: 100; 10 SYRUP ORAL at 09:12

## 2021-09-25 RX ADMIN — ENOXAPARIN SODIUM 30 MG: 30 INJECTION SUBCUTANEOUS at 21:17

## 2021-09-25 RX ADMIN — DEXAMETHASONE SODIUM PHOSPHATE 10 MG: 4 INJECTION, SOLUTION INTRAMUSCULAR; INTRAVENOUS at 09:13

## 2021-09-25 RX ADMIN — Medication 10 ML: at 21:17

## 2021-09-25 ASSESSMENT — PAIN SCALES - GENERAL
PAINLEVEL_OUTOF10: 0

## 2021-09-25 NOTE — PLAN OF CARE
Problem: Airway Clearance - Ineffective  Goal: Achieve or maintain patent airway  Outcome: Ongoing     Problem: Gas Exchange - Impaired  Goal: Absence of hypoxia  9/25/2021 1418 by Katherine Hyman RN  Outcome: Ongoing   Pt on 5L with O2>90% through shift. Problem: Isolation Precautions - Risk of Spread of Infection  Goal: Prevent transmission of infection  9/25/2021 1418 by Katherine Hyman RN  Outcome: Met This Shift   Pt followed isolation precautions through shift. Problem: Falls - Risk of:  Goal: Will remain free from falls  Description: Will remain free from falls  9/25/2021 1418 by Katherine Hyman RN  Outcome: Met This Shift   Pt will remain free from accidental injury this shift. Pt has fall risk measures (fall risk bracelet, fall risk sign, fall risk cloth, non-slip socks, dome light on) in place. Pt bed is in low position, bed alarm on, bed wheels locked, call light within reach, bedside table within reach, chair wheels locked, chair alarm on. Problem: Serum Glucose Level - Abnormal:  Goal: Ability to maintain appropriate glucose levels will improve  Description: Ability to maintain appropriate glucose levels will improve  9/25/2021 1418 by Katherine Hyman RN  Outcome: Ongoing   Pt on sliding scale insulin. Given as needed before meals.

## 2021-09-25 NOTE — PROGRESS NOTES
Hospitalist Progress Note      PCP: No primary care provider on file. Date of Admission: 9/20/2021    Chief Complaint: [facility] Course: Patient is admitted with COVID 19 at the end of first week since symptoms onset (9/15).    Currently patient is on 14 liters O2, up from initial 3 in ER. He denies any subjective worsening of his breathing. He is optimistic about his recovery. No chest pain. Tolerating PO well. Urinating. Patient denies need to update his family/friends. Subjective: feeling much better, still on 5 liter oxygen but over all feeling good       Medications:  Reviewed    Infusion Medications    sodium chloride 25 mL (09/24/21 2057)    dextrose       Scheduled Medications    dexamethasone  10 mg IntraVENous Daily    ascorbic acid  500 mg Oral Daily    baricitinib  4 mg Oral Daily    sodium chloride flush  5-40 mL IntraVENous 2 times per day    enoxaparin  30 mg SubCUTAneous BID    guaiFENesin-dextromethorphan  5 mL Oral 4x Daily    Vitamin D  2,000 Units Oral Daily    CENTRUM/CERTA-ISSA with minerals oral  15 mL Oral Daily    thiamine mononitrate  100 mg Oral Daily    insulin lispro  0-6 Units SubCUTAneous TID WC    insulin lispro  0-3 Units SubCUTAneous Nightly    pantoprazole  40 mg Oral QAM AC     PRN Meds: sodium chloride flush, sodium chloride, ondansetron **OR** ondansetron, polyethylene glycol, acetaminophen **OR** acetaminophen, sodium chloride, glucose, dextrose, glucagon (rDNA), dextrose      Intake/Output Summary (Last 24 hours) at 9/25/2021 1114  Last data filed at 9/25/2021 0841  Gross per 24 hour   Intake 460 ml   Output 1075 ml   Net -615 ml       Physical Exam Performed:    /87   Pulse 78   Temp 97.7 °F (36.5 °C) (Oral)   Resp 18   Ht 5' 6\" (1.676 m)   Wt 167 lb (75.8 kg)   SpO2 90%   BMI 26.95 kg/m²     General appearance: No apparent distress, appears stated age and cooperative. HEENT: Pupils equal, round, and reactive to light. Conjunctivae/corneas clear. Neck: Supple, with full range of motion. No jugular venous distention. Trachea midline. Respiratory:  Normal respiratory effort. Clear to auscultation, bilaterally without Rales/Wheezes/Rhonchi. Cardiovascular: Regular rate and rhythm with normal S1/S2 without murmurs, rubs or gallops. Abdomen: Soft, non-tender, non-distended with normal bowel sounds. Musculoskeletal: No clubbing, cyanosis or edema bilaterally. Full range of motion without deformity. Skin: Skin color, texture, turgor normal.  No rashes or lesions. Neurologic:  Neurovascularly intact without any focal sensory/motor deficits. Cranial nerves: II-XII intact, grossly non-focal.  Psychiatric: Alert and oriented, thought content appropriate, normal insight  Capillary Refill: Brisk,3 seconds, normal   Peripheral Pulses: +2 palpable, equal bilaterally       Labs:   No results for input(s): WBC, HGB, HCT, PLT in the last 72 hours. Recent Labs     09/23/21  0502 09/24/21  0525 09/25/21  0525    140 141   K 4.0 4.5 4.3    106 106   CO2 23 23 22   BUN 29* 28* 28*   CREATININE 1.0 1.0 1.0   CALCIUM 7.9* 7.9* 7.8*   PHOS 3.7 3.6 3.5     Recent Labs     09/23/21  0502 09/24/21  0525 09/25/21  0525   AST 34 48* 48*   ALT 19 32 40   BILIDIR 0.3 0.3 <0.2   BILITOT 0.7 0.7 0.7   ALKPHOS 55 55 57     No results for input(s): INR in the last 72 hours. No results for input(s): [place]illermina [place] in the last 72 hours. Urinalysis:      Lab Results   Component Value Date    NITRU POSITIVE 02/06/2018    WBCUA see below 02/06/2018    BACTERIA 2+ 01/15/2018    RBCUA >100 02/06/2018    BLOODU LARGE 02/06/2018    SPECGRAV <=1.005 02/06/2018    GLUCOSEU Negative 02/06/2018       Radiology:  VL Extremity Venous Bilateral   Final Result      CTA PULMONARY W CONTRAST   Final Result   1. No evidence of pulmonary embolus. 2. Diffuse bilateral ground glass pulmonary opacities representing a classic appearance for COVID pneumonia. XR CHEST PORTABLE   Final Result      Coarse bilateral basilar predominant consolidation is compatible with atypical/viral pneumonia in the proper clinical context. Elevated right hemidiaphragm. Normal cardiomediastinal silhouette. Assessment/Plan:    Active Hospital Problems    Diagnosis     Pneumonia due to COVID-19 virus [U07.1, J12.82]         COVID 19 pneumonia with acute hypoxic resp failure:  Procal, legionella, strep AG negative  CRP moderately elevated at 43  Oxygen requirements are escalating rapidly   Cont with decadron,   - s/p remdesivir 9/24/21  - still on baricitinib  - Consult pulmonology   - can probably discharge over the weekend  - home O2 evaluation showed O2 increased with activity  - will try one dose of lasix     Elevated Ddimer:  CTA chest negative for PE  Venous doppler negative  Cont with lovenox prophy dose      BPH:  Monitor for urinary retention     Hyponatremia:  Improved with IVF  - stopped fluid       DVT Prophylaxis: lovenox  Diet: ADULT DIET;  Regular  Code Status: Full Code    PT/OT Eval Status: on going    Dispo - [place]    Pawel Mckeon DO

## 2021-09-26 LAB
ALBUMIN SERPL-MCNC: 3 G/DL (ref 3.4–5)
ALP BLD-CCNC: 63 U/L (ref 40–129)
ALT SERPL-CCNC: 38 U/L (ref 10–40)
ANION GAP SERPL CALCULATED.3IONS-SCNC: 11 MMOL/L (ref 3–16)
AST SERPL-CCNC: 36 U/L (ref 15–37)
BILIRUB SERPL-MCNC: 0.9 MG/DL (ref 0–1)
BILIRUBIN DIRECT: 0.3 MG/DL (ref 0–0.3)
BILIRUBIN, INDIRECT: 0.6 MG/DL (ref 0–1)
BUN BLDV-MCNC: 33 MG/DL (ref 7–20)
CALCIUM SERPL-MCNC: 8.1 MG/DL (ref 8.3–10.6)
CHLORIDE BLD-SCNC: 105 MMOL/L (ref 99–110)
CO2: 25 MMOL/L (ref 21–32)
CREAT SERPL-MCNC: 1 MG/DL (ref 0.8–1.3)
D DIMER: >5250 NG/ML DDU (ref 0–229)
GFR AFRICAN AMERICAN: >60
GFR NON-AFRICAN AMERICAN: >60
GLUCOSE BLD-MCNC: 121 MG/DL (ref 70–99)
GLUCOSE BLD-MCNC: 121 MG/DL (ref 70–99)
GLUCOSE BLD-MCNC: 140 MG/DL (ref 70–99)
GLUCOSE BLD-MCNC: 152 MG/DL (ref 70–99)
GLUCOSE BLD-MCNC: 91 MG/DL (ref 70–99)
PERFORMED ON: ABNORMAL
PERFORMED ON: NORMAL
PHOSPHORUS: 3.9 MG/DL (ref 2.5–4.9)
POTASSIUM SERPL-SCNC: 4.2 MMOL/L (ref 3.5–5.1)
SODIUM BLD-SCNC: 141 MMOL/L (ref 136–145)
TOTAL PROTEIN: 6.9 G/DL (ref 6.4–8.2)

## 2021-09-26 PROCEDURE — 6370000000 HC RX 637 (ALT 250 FOR IP): Performed by: INTERNAL MEDICINE

## 2021-09-26 PROCEDURE — 80069 RENAL FUNCTION PANEL: CPT

## 2021-09-26 PROCEDURE — 36415 COLL VENOUS BLD VENIPUNCTURE: CPT

## 2021-09-26 PROCEDURE — 94761 N-INVAS EAR/PLS OXIMETRY MLT: CPT

## 2021-09-26 PROCEDURE — 2580000003 HC RX 258: Performed by: INTERNAL MEDICINE

## 2021-09-26 PROCEDURE — 80076 HEPATIC FUNCTION PANEL: CPT

## 2021-09-26 PROCEDURE — 2060000000 HC ICU INTERMEDIATE R&B

## 2021-09-26 PROCEDURE — 85379 FIBRIN DEGRADATION QUANT: CPT

## 2021-09-26 PROCEDURE — 2700000000 HC OXYGEN THERAPY PER DAY

## 2021-09-26 PROCEDURE — 6360000002 HC RX W HCPCS: Performed by: INTERNAL MEDICINE

## 2021-09-26 PROCEDURE — 6360000002 HC RX W HCPCS: Performed by: STUDENT IN AN ORGANIZED HEALTH CARE EDUCATION/TRAINING PROGRAM

## 2021-09-26 RX ORDER — FUROSEMIDE 10 MG/ML
20 INJECTION INTRAMUSCULAR; INTRAVENOUS ONCE
Status: COMPLETED | OUTPATIENT
Start: 2021-09-26 | End: 2021-09-26

## 2021-09-26 RX ADMIN — Medication 2000 UNITS: at 10:37

## 2021-09-26 RX ADMIN — ENOXAPARIN SODIUM 30 MG: 30 INJECTION SUBCUTANEOUS at 10:37

## 2021-09-26 RX ADMIN — FUROSEMIDE 20 MG: 10 INJECTION, SOLUTION INTRAMUSCULAR; INTRAVENOUS at 10:37

## 2021-09-26 RX ADMIN — INSULIN LISPRO 1 UNITS: 100 INJECTION, SOLUTION INTRAVENOUS; SUBCUTANEOUS at 18:45

## 2021-09-26 RX ADMIN — GUAIFENESIN SYRUP AND DEXTROMETHORPHAN 5 ML: 100; 10 SYRUP ORAL at 10:37

## 2021-09-26 RX ADMIN — Medication 100 MG: at 10:38

## 2021-09-26 RX ADMIN — ENOXAPARIN SODIUM 30 MG: 30 INJECTION SUBCUTANEOUS at 22:42

## 2021-09-26 RX ADMIN — DEXAMETHASONE SODIUM PHOSPHATE 10 MG: 4 INJECTION, SOLUTION INTRAMUSCULAR; INTRAVENOUS at 10:37

## 2021-09-26 RX ADMIN — Medication 10 ML: at 22:42

## 2021-09-26 RX ADMIN — Medication 10 ML: at 09:00

## 2021-09-26 RX ADMIN — GUAIFENESIN SYRUP AND DEXTROMETHORPHAN 5 ML: 100; 10 SYRUP ORAL at 22:43

## 2021-09-26 RX ADMIN — OXYCODONE HYDROCHLORIDE AND ACETAMINOPHEN 500 MG: 500 TABLET ORAL at 10:38

## 2021-09-26 RX ADMIN — GUAIFENESIN SYRUP AND DEXTROMETHORPHAN 5 ML: 100; 10 SYRUP ORAL at 17:06

## 2021-09-26 RX ADMIN — PANTOPRAZOLE SODIUM 40 MG: 40 TABLET, DELAYED RELEASE ORAL at 06:14

## 2021-09-26 RX ADMIN — BARICITINIB 4 MG: 2 TABLET, FILM COATED ORAL at 10:38

## 2021-09-26 ASSESSMENT — PAIN SCALES - GENERAL
PAINLEVEL_OUTOF10: 0

## 2021-09-26 NOTE — PROGRESS NOTES
Hospitalist Progress Note      PCP: No primary care provider on file. Date of Admission: 9/20/2021    Chief Complaint: [facility] Course: Patient is admitted with COVID 19 at the end of first week since symptoms onset (9/15).    He denies any subjective worsening of his breathing. He is optimistic about his recovery. No chest pain. Tolerating PO well. Urinating. Patient denies need to update his family/friends. Subjective: feeling much better, down to 3 liters after IV lasix yesterday    Medications:  Reviewed    Infusion Medications    sodium chloride 25 mL (09/24/21 2057)    dextrose       Scheduled Medications    dexamethasone  10 mg IntraVENous Daily    ascorbic acid  500 mg Oral Daily    baricitinib  4 mg Oral Daily    sodium chloride flush  5-40 mL IntraVENous 2 times per day    enoxaparin  30 mg SubCUTAneous BID    guaiFENesin-dextromethorphan  5 mL Oral 4x Daily    Vitamin D  2,000 Units Oral Daily    CENTRUM/CERTA-ISSA with minerals oral  15 mL Oral Daily    thiamine mononitrate  100 mg Oral Daily    insulin lispro  0-6 Units SubCUTAneous TID WC    insulin lispro  0-3 Units SubCUTAneous Nightly    pantoprazole  40 mg Oral QAM AC     PRN Meds: sodium chloride flush, sodium chloride, ondansetron **OR** ondansetron, polyethylene glycol, acetaminophen **OR** acetaminophen, sodium chloride, glucose, dextrose, glucagon (rDNA), dextrose      Intake/Output Summary (Last 24 hours) at 9/26/2021 0725  Last data filed at 9/25/2021 2324  Gross per 24 hour   Intake 480 ml   Output 1510 ml   Net -1030 ml       Physical Exam Performed:    BP (!) 143/86   Pulse 53   Temp 97.6 °F (36.4 °C) (Oral)   Resp 18   Ht 5' 6\" (1.676 m)   Wt 167 lb (75.8 kg)   SpO2 94%   BMI 26.95 kg/m²     General appearance: No apparent distress, appears stated age and cooperative. HEENT: Pupils equal, round, and reactive to light. Conjunctivae/corneas clear. Neck: Supple, with full range of motion.  No jugular venous distention. Trachea midline. Respiratory:  Normal respiratory effort. Clear to auscultation, bilaterally without Rales/Wheezes/Rhonchi. Cardiovascular: Regular rate and rhythm with normal S1/S2 without murmurs, rubs or gallops. Abdomen: Soft, non-tender, non-distended with normal bowel sounds. Musculoskeletal: No clubbing, cyanosis or edema bilaterally. Full range of motion without deformity. Skin: Skin color, texture, turgor normal.  No rashes or lesions. Neurologic:  Neurovascularly intact without any focal sensory/motor deficits. Cranial nerves: II-XII intact, grossly non-focal.  Psychiatric: Alert and oriented, thought content appropriate, normal insight  Capillary Refill: Brisk,3 seconds, normal   Peripheral Pulses: +2 palpable, equal bilaterally       Labs:   No results for input(s): WBC, HGB, HCT, PLT in the last 72 hours. Recent Labs     09/24/21 0525 09/25/21  0525 09/26/21  0456    141 141   K 4.5 4.3 4.2    106 105   CO2 23 22 25   BUN 28* 28* 33*   CREATININE 1.0 1.0 1.0   CALCIUM 7.9* 7.8* 8.1*   PHOS 3.6 3.5 3.9     Recent Labs     09/24/21 0525 09/25/21  0525 09/26/21  0456   AST 48* 48* 36   ALT 32 40 38   BILIDIR 0.3 <0.2 0.3   BILITOT 0.7 0.7 0.9   ALKPHOS 55 57 63     No results for input(s): INR in the last 72 hours. No results for input(s): [place] in the last 72 hours. Urinalysis:      Lab Results   Component Value Date    NITRU POSITIVE 02/06/2018    WBCUA see below 02/06/2018    BACTERIA 2+ 01/15/2018    RBCUA >100 02/06/2018    BLOODU LARGE 02/06/2018    SPECGRAV <=1.005 02/06/2018    GLUCOSEU Negative 02/06/2018       Radiology:  VL Extremity Venous Bilateral   Final Result      CTA PULMONARY W CONTRAST   Final Result   1. No evidence of pulmonary embolus. 2. Diffuse bilateral ground glass pulmonary opacities representing a classic appearance for COVID pneumonia.                XR CHEST PORTABLE   Final Result      Coarse bilateral basilar predominant consolidation is compatible with atypical/viral pneumonia in the proper clinical context. Elevated right hemidiaphragm. Normal cardiomediastinal silhouette. Assessment/Plan:    Active Hospital Problems    Diagnosis     Pneumonia due to COVID-19 virus [U07.1, J12.82]         COVID 19 pneumonia with acute hypoxic resp failure:  Procal, legionella, strep AG negative  CRP moderately elevated at 43  Oxygen requirements are escalating rapidly   Cont with decadron,   - s/p remdesivir 9/24/21  - still on baricitinib  - Consult pulmonology   - can probably discharge over the weekend  - home O2 evaluation showed O2 increased with activity  - s/p lasix 9/25/21  - oxygen demand seem to have improved  - will try one more dose of lasix today     Elevated Ddimer:  CTA chest negative for PE  Venous doppler negative  Cont with lovenox prophy dose      BPH:  Monitor for urinary retention     Hyponatremia:  Improved with IVF  - stopped fluid       DVT Prophylaxis: lovenox  Diet: ADULT DIET;  Regular  Code Status: Full Code    PT/OT Eval Status: on going    Dispo - [place]    Iain Patel DO

## 2021-09-26 NOTE — PLAN OF CARE
Problem: Airway Clearance - Ineffective  Goal: Achieve or maintain patent airway  Outcome: Met This Shift     Problem: Gas Exchange - Impaired  Goal: Absence of hypoxia  Outcome: Ongoing  Pt on 3L and O2>90% through shift. Problem: Body Temperature -  Risk of, Imbalanced  Goal: Ability to maintain a body temperature within defined limits  Outcome: Met This Shift  Pt afebrile through shift. Problem: Isolation Precautions - Risk of Spread of Infection  Goal: Prevent transmission of infection  Outcome: Met This Shift   Followed isolation precautions through shift.

## 2021-09-26 NOTE — PLAN OF CARE
Problem: Gas Exchange - Impaired  Goal: Promote optimal lung function  Outcome: Ongoing     Pt able to maintain respiration between 16-18 beats per minute, oxygen saturation greater than 90% with  3 liters high flow supplemental oxygen. Pt sleeping comfortably in bed. Will continue to monitor. Problem: Serum Glucose Level - Abnormal:  Goal: Ability to maintain appropriate glucose levels will improve  Description: Ability to maintain appropriate glucose levels will improve  9/25/2021 2235 by Santino Langston RN  Outcome: Ongoing   Pt's blood sugar  controlled with diet and insulin. Will continue to monitor. Problem: Falls - Risk of:  Goal: Will remain free from falls  Description: Will remain free from falls  9/25/2021 2235 by Santino Langston RN  Outcome: Ongoing  Pt remains without falls during this shift. Pt does not attempt to get OOB alone. Pt able to call out appropriately, call light within reach. Safety precautions in place include fall armband, fall towel, chair & bed alarms, non slip foot wear. Signs posted on door, and door remains open for observation. The bed is in lowest position, wheels are locked, and rails are up 2/4. Will continue with current care.

## 2021-09-27 ENCOUNTER — APPOINTMENT (OUTPATIENT)
Dept: GENERAL RADIOLOGY | Age: 66
DRG: 871 | End: 2021-09-27

## 2021-09-27 ENCOUNTER — APPOINTMENT (OUTPATIENT)
Dept: CT IMAGING | Age: 66
DRG: 871 | End: 2021-09-27

## 2021-09-27 LAB
ALBUMIN SERPL-MCNC: 3.2 G/DL (ref 3.4–5)
ALP BLD-CCNC: 65 U/L (ref 40–129)
ALT SERPL-CCNC: 34 U/L (ref 10–40)
ANION GAP SERPL CALCULATED.3IONS-SCNC: 12 MMOL/L (ref 3–16)
AST SERPL-CCNC: 29 U/L (ref 15–37)
BASOPHILS ABSOLUTE: 0.1 K/UL (ref 0–0.2)
BASOPHILS RELATIVE PERCENT: 0.7 %
BILIRUB SERPL-MCNC: 1 MG/DL (ref 0–1)
BILIRUBIN DIRECT: 0.3 MG/DL (ref 0–0.3)
BILIRUBIN, INDIRECT: 0.7 MG/DL (ref 0–1)
BUN BLDV-MCNC: 37 MG/DL (ref 7–20)
CALCIUM SERPL-MCNC: 8.2 MG/DL (ref 8.3–10.6)
CHLORIDE BLD-SCNC: 102 MMOL/L (ref 99–110)
CO2: 24 MMOL/L (ref 21–32)
CREAT SERPL-MCNC: 1.1 MG/DL (ref 0.8–1.3)
D DIMER: 980 NG/ML DDU (ref 0–229)
EOSINOPHILS ABSOLUTE: 0 K/UL (ref 0–0.6)
EOSINOPHILS RELATIVE PERCENT: 0 %
GFR AFRICAN AMERICAN: >60
GFR NON-AFRICAN AMERICAN: >60
GLUCOSE BLD-MCNC: 108 MG/DL (ref 70–99)
GLUCOSE BLD-MCNC: 127 MG/DL (ref 70–99)
GLUCOSE BLD-MCNC: 130 MG/DL (ref 70–99)
GLUCOSE BLD-MCNC: 161 MG/DL (ref 70–99)
GLUCOSE BLD-MCNC: 185 MG/DL (ref 70–99)
HCT VFR BLD CALC: 44.7 % (ref 40.5–52.5)
HEMOGLOBIN: 14.9 G/DL (ref 13.5–17.5)
LYMPHOCYTES ABSOLUTE: 0.3 K/UL (ref 1–5.1)
LYMPHOCYTES RELATIVE PERCENT: 1.6 %
MCH RBC QN AUTO: 31.5 PG (ref 26–34)
MCHC RBC AUTO-ENTMCNC: 33.4 G/DL (ref 31–36)
MCV RBC AUTO: 94.5 FL (ref 80–100)
MONOCYTES ABSOLUTE: 0.4 K/UL (ref 0–1.3)
MONOCYTES RELATIVE PERCENT: 2.4 %
NEUTROPHILS ABSOLUTE: 17.3 K/UL (ref 1.7–7.7)
NEUTROPHILS RELATIVE PERCENT: 95.3 %
PDW BLD-RTO: 13.3 % (ref 12.4–15.4)
PERFORMED ON: ABNORMAL
PHOSPHORUS: 4 MG/DL (ref 2.5–4.9)
PLATELET # BLD: 400 K/UL (ref 135–450)
PMV BLD AUTO: 8.2 FL (ref 5–10.5)
POTASSIUM SERPL-SCNC: 4 MMOL/L (ref 3.5–5.1)
PROCALCITONIN: 0.05 NG/ML (ref 0–0.15)
RBC # BLD: 4.73 M/UL (ref 4.2–5.9)
SODIUM BLD-SCNC: 138 MMOL/L (ref 136–145)
TOTAL PROTEIN: 7.1 G/DL (ref 6.4–8.2)
WBC # BLD: 18.2 K/UL (ref 4–11)

## 2021-09-27 PROCEDURE — 94761 N-INVAS EAR/PLS OXIMETRY MLT: CPT

## 2021-09-27 PROCEDURE — 80069 RENAL FUNCTION PANEL: CPT

## 2021-09-27 PROCEDURE — 6370000000 HC RX 637 (ALT 250 FOR IP): Performed by: INTERNAL MEDICINE

## 2021-09-27 PROCEDURE — 2700000000 HC OXYGEN THERAPY PER DAY

## 2021-09-27 PROCEDURE — 80076 HEPATIC FUNCTION PANEL: CPT

## 2021-09-27 PROCEDURE — 71045 X-RAY EXAM CHEST 1 VIEW: CPT

## 2021-09-27 PROCEDURE — 2580000003 HC RX 258: Performed by: INTERNAL MEDICINE

## 2021-09-27 PROCEDURE — 85025 COMPLETE CBC W/AUTO DIFF WBC: CPT

## 2021-09-27 PROCEDURE — 6360000004 HC RX CONTRAST MEDICATION: Performed by: INTERNAL MEDICINE

## 2021-09-27 PROCEDURE — 6360000002 HC RX W HCPCS: Performed by: STUDENT IN AN ORGANIZED HEALTH CARE EDUCATION/TRAINING PROGRAM

## 2021-09-27 PROCEDURE — 84145 PROCALCITONIN (PCT): CPT

## 2021-09-27 PROCEDURE — 87641 MR-STAPH DNA AMP PROBE: CPT

## 2021-09-27 PROCEDURE — 2060000000 HC ICU INTERMEDIATE R&B

## 2021-09-27 PROCEDURE — 85379 FIBRIN DEGRADATION QUANT: CPT

## 2021-09-27 PROCEDURE — 36415 COLL VENOUS BLD VENIPUNCTURE: CPT

## 2021-09-27 PROCEDURE — 71275 CT ANGIOGRAPHY CHEST: CPT

## 2021-09-27 PROCEDURE — 6360000002 HC RX W HCPCS: Performed by: INTERNAL MEDICINE

## 2021-09-27 RX ADMIN — ENOXAPARIN SODIUM 30 MG: 30 INJECTION SUBCUTANEOUS at 21:49

## 2021-09-27 RX ADMIN — OXYCODONE HYDROCHLORIDE AND ACETAMINOPHEN 500 MG: 500 TABLET ORAL at 10:12

## 2021-09-27 RX ADMIN — DEXAMETHASONE SODIUM PHOSPHATE 10 MG: 4 INJECTION, SOLUTION INTRAMUSCULAR; INTRAVENOUS at 10:12

## 2021-09-27 RX ADMIN — SODIUM CHLORIDE 25 ML: 9 INJECTION, SOLUTION INTRAVENOUS at 15:30

## 2021-09-27 RX ADMIN — GUAIFENESIN SYRUP AND DEXTROMETHORPHAN 5 ML: 100; 10 SYRUP ORAL at 21:50

## 2021-09-27 RX ADMIN — INSULIN LISPRO 1 UNITS: 100 INJECTION, SOLUTION INTRAVENOUS; SUBCUTANEOUS at 21:50

## 2021-09-27 RX ADMIN — CEFEPIME HYDROCHLORIDE 2000 MG: 2 INJECTION, POWDER, FOR SOLUTION INTRAVENOUS at 15:35

## 2021-09-27 RX ADMIN — Medication 10 ML: at 21:50

## 2021-09-27 RX ADMIN — Medication 10 ML: at 10:17

## 2021-09-27 RX ADMIN — CEFEPIME HYDROCHLORIDE 2000 MG: 2 INJECTION, POWDER, FOR SOLUTION INTRAVENOUS at 23:58

## 2021-09-27 RX ADMIN — VANCOMYCIN HYDROCHLORIDE 750 MG: 10 INJECTION, POWDER, LYOPHILIZED, FOR SOLUTION INTRAVENOUS at 18:01

## 2021-09-27 RX ADMIN — SODIUM CHLORIDE 25 ML: 9 INJECTION, SOLUTION INTRAVENOUS at 17:59

## 2021-09-27 RX ADMIN — INSULIN LISPRO 1 UNITS: 100 INJECTION, SOLUTION INTRAVENOUS; SUBCUTANEOUS at 18:01

## 2021-09-27 RX ADMIN — BARICITINIB 4 MG: 2 TABLET, FILM COATED ORAL at 10:32

## 2021-09-27 RX ADMIN — PANTOPRAZOLE SODIUM 40 MG: 40 TABLET, DELAYED RELEASE ORAL at 06:39

## 2021-09-27 RX ADMIN — Medication 2000 UNITS: at 10:12

## 2021-09-27 RX ADMIN — Medication 100 MG: at 10:12

## 2021-09-27 RX ADMIN — ENOXAPARIN SODIUM 30 MG: 30 INJECTION SUBCUTANEOUS at 10:11

## 2021-09-27 RX ADMIN — GUAIFENESIN SYRUP AND DEXTROMETHORPHAN 5 ML: 100; 10 SYRUP ORAL at 10:12

## 2021-09-27 RX ADMIN — IOPAMIDOL 80 ML: 755 INJECTION, SOLUTION INTRAVENOUS at 11:05

## 2021-09-27 RX ADMIN — Medication 15 ML: at 15:17

## 2021-09-27 ASSESSMENT — PAIN SCALES - GENERAL
PAINLEVEL_OUTOF10: 0

## 2021-09-27 NOTE — PROGRESS NOTES
Pulmonology Progress Note  PGY-2    Admit Date: 9/20/2021  Hospital Day: 8  Diet: ADULT DIET; Regular  Code Status: Full Code       Interval history:  Patient was seen and examined this morning. Pt remained afebrile overnight. Patient's oxygen requirement increased yesterday to 7L. Pt endorses SOB. However, mentions that his SOB is improving. Reports non productive cough. Denies fevers, chills, chest pain, shortness of breath, nausea, vomiting, diarrhea, or constipation. Medications:   Scheduled Meds:   vancomycin  750 mg IntraVENous Q12H    cefepime  2,000 mg IntraVENous Q8H    dexamethasone  10 mg IntraVENous Daily    ascorbic acid  500 mg Oral Daily    baricitinib  4 mg Oral Daily    sodium chloride flush  5-40 mL IntraVENous 2 times per day    enoxaparin  30 mg SubCUTAneous BID    guaiFENesin-dextromethorphan  5 mL Oral 4x Daily    Vitamin D  2,000 Units Oral Daily    CENTRUM/CERTA-ISSA with minerals oral  15 mL Oral Daily    thiamine mononitrate  100 mg Oral Daily    insulin lispro  0-6 Units SubCUTAneous TID WC    insulin lispro  0-3 Units SubCUTAneous Nightly    pantoprazole  40 mg Oral QAM AC       Continuous Infusions:   sodium chloride 25 mL (09/27/21 1530)    dextrose         PRN Meds:  sodium chloride flush, sodium chloride, ondansetron **OR** ondansetron, polyethylene glycol, acetaminophen **OR** acetaminophen, sodium chloride, glucose, dextrose, glucagon (rDNA), dextrose    Vital/I&O/Physical examination:   VS:  /86   Pulse 90   Temp 98.2 °F (36.8 °C) (Oral)   Resp 18   Ht 5' 6\" (1.676 m)   Wt 167 lb (75.8 kg)   SpO2 95%   BMI 26.95 kg/m²     I/O:    Intake/Output Summary (Last 24 hours) at 9/27/2021 1603  Last data filed at 9/27/2021 1340  Gross per 24 hour   Intake 490 ml   Output 1070 ml   Net -580 ml       PE:  Physical Exam  Vitals reviewed. Constitutional:       Appearance: Normal appearance. HENT:      Head: Normocephalic and atraumatic.       Nose: Nose normal.      Mouth/Throat:      Mouth: Mucous membranes are moist.   Eyes:      Extraocular Movements: Extraocular movements intact. Conjunctiva/sclera: Conjunctivae normal.      Pupils: Pupils are equal, round, and reactive to light. Cardiovascular:      Rate and Rhythm: Normal rate and regular rhythm. Pulses: Normal pulses. Heart sounds: Normal heart sounds. Pulmonary:      Effort: Pulmonary effort is normal.      Comments: Crackle on the right lung base, On 6L of HFNC  Abdominal:      Palpations: Abdomen is soft. Musculoskeletal:         General: Normal range of motion. Cervical back: Normal range of motion and neck supple. Neurological:      General: No focal deficit present. Mental Status: He is alert and oriented to person, place, and time. Labs & Imaging:   LABS:  Renal:   Recent Labs     09/25/21  0525 09/26/21  0456 09/27/21  0550    141 138   K 4.3 4.2 4.0    105 102   CO2 22 25 24   BUN 28* 33* 37*   CREATININE 1.0 1.0 1.1   GLUCOSE 160* 121* 108*   ANIONGAP 13 11 12     CBC:   Recent Labs     09/27/21  1458   WBC 18.2*   HGB 14.9   HCT 44.7      MCV 94.5                            Hepatic:   Recent Labs     09/25/21  0525 09/26/21  0456 09/27/21  0550   AST 48* 36 29   ALT 40 38 34   BILITOT 0.7 0.9 1.0   ALKPHOS 57 63 65     Troponin: No results for input(s): TROPONINI in the last 72 hours. BNP: No results for input(s): BNP in the last 72 hours. Lipids: No results for input(s): CHOL, HDL in the last 72 hours. Invalid input(s): LDLCALCU, TRIGLYCERIDE  INR: No results for input(s): INR in the last 72 hours. Lactate: No results for input(s): LACTATE in the last 72 hours. ABGs:No results for input(s): PHART, ENL1MAH, PO2ART, KNC7FDI, BEART, THGBART, G9NVVULG, PPB9JGX in the last 72 hours.     UA:No results for input(s): NITRITE, COLORU, PHUR, LABCAST, WBCUA, RBCUA, MUCUS, TRICHOMONAS, YEAST, BACTERIA, CLARITYU, SPECGRAV, LEUKOCYTESUR, UROBILINOGEN, BILIRUBINUR, BLOODU, GLUCOSEU, AMORPHOUS in the last 72 hours. Invalid input(s): KETONESU     IMAGING:  CTA PULMONARY W CONTRAST   Final Result      1. No evidence of pulmonary embolus. 2.  Bilateral peripheral groundglass attenuation related to viral pneumonia, similar to prior chest CT. 3.  Increased consolidation in the right lower lobe representing worsening infection. 4.  New 1.5 cm cavitary nodule in the left upper lobe is also likely related to evolving viral pneumonia. XR CHEST PORTABLE   Final Result      1. Minimal basilar airspace disease with mild interval improvement of the right lung base. VL Extremity Venous Bilateral   Final Result      CTA PULMONARY W CONTRAST   Final Result   1. No evidence of pulmonary embolus. 2. Diffuse bilateral ground glass pulmonary opacities representing a classic appearance for COVID pneumonia. XR CHEST PORTABLE   Final Result      Coarse bilateral basilar predominant consolidation is compatible with atypical/viral pneumonia in the proper clinical context. Elevated right hemidiaphragm. Normal cardiomediastinal silhouette. Assessment & Plan:    Lianna Baeza is a 60 y.o. male with PMH of BPH s/p TRUP who was admitted for shortness of breath. Worsening Shortness of breath:  - 2/2 to most likely superimposed bacterial pneumonia  - Patient was presented with shortness of breath and found to have COvid pneumonia.  - His oxygen requirement was improving. However, patient started to have worsening of his SOB. - CT chest was done and showed increased consolidation in the right lower lobe representing worsening infection. New 1.5 cm cavitary nodule in the left upper lobe is most consistent with a secondary infection, considering particularly staph.- MRSA pending  - Vanc and Mika     Covid pneumonia. - Pt presented with SOB, fatigue, decrease in appetite. He did not receive Covid shot.   - In ED, T 98.3F, RR 20, , /91, SpO2 84% on room air. Patient was started on HFNC.    - Chest x ray showed coarse bilateral basilar predominant consolidation is compatible with atypical/viral pneumonia. - Covid test positive. - Chest CT showed diffuse bilateral ground glass pulmonary opacities representing a classic appearance for COVID pneumonia.  - S/p IV Dexamethasone 20 mg daily for 5 days. Currently, on dexamethasone 10 mg for 5 days. (day3)   - S/p Remdesivir  - Baricitinib 4 mg     Elevated D Dimer:  - CT chest negative for PE  - Doppler U/S of LE showed no evidence of deep or superficial venous thrombosis       Code Status: Full Code  ADULT DIET; Regular  PPX: Protonix; Lovenox       This patient will be discussed with attending, Dr. Nick Leong MD.    Ramila Acharya MD, PGY- 2  Contact via [facility]  9/27/2021,  4:03 PM  Patient examined, findings as discussed with Dr. Jaret Gale. Agree with assessment and plan as edited above. Development of a single cavitary lesion most consistent with secondary infection. There is no indication on the initial CT scan of any lesion in that area that would raise the suspicion of neoplasm or other more chronic infection. Staph aureus is highly suspicious in this situation, given the recent viral infection. Treating at this time with broad-spectrum antibiotics is appropriate. We may not have a firm diagnosis of the etiologic agent, and we will will probably be looking at selecting empiric therapy to continue for 3 weeks upon discharge home.

## 2021-09-27 NOTE — PROGRESS NOTES
Hospitalist Progress Note      PCP: No primary care provider on file. Date of Admission: 9/20/2021    Chief Complaint: [facility] Course: Patient is admitted with COVID 19 at the end of first week since symptoms onset (9/15).      Subjective: this morning patient was getting progressively hypoxic and up to 7 liters by respiratory. Denies subjective change. No chest pain. Cough is mostly dry. Patient denies need to update his family. Medications:  Reviewed    Infusion Medications    sodium chloride 25 mL (09/24/21 2057)    dextrose       Scheduled Medications    dexamethasone  10 mg IntraVENous Daily    ascorbic acid  500 mg Oral Daily    baricitinib  4 mg Oral Daily    sodium chloride flush  5-40 mL IntraVENous 2 times per day    enoxaparin  30 mg SubCUTAneous BID    guaiFENesin-dextromethorphan  5 mL Oral 4x Daily    Vitamin D  2,000 Units Oral Daily    CENTRUM/CERTA-ISSA with minerals oral  15 mL Oral Daily    thiamine mononitrate  100 mg Oral Daily    insulin lispro  0-6 Units SubCUTAneous TID WC    insulin lispro  0-3 Units SubCUTAneous Nightly    pantoprazole  40 mg Oral QAM AC     PRN Meds: sodium chloride flush, sodium chloride, ondansetron **OR** ondansetron, polyethylene glycol, acetaminophen **OR** acetaminophen, sodium chloride, glucose, dextrose, glucagon (rDNA), dextrose      Intake/Output Summary (Last 24 hours) at 9/27/2021 1025  Last data filed at 9/27/2021 0930  Gross per 24 hour   Intake 830 ml   Output 1320 ml   Net -490 ml       Physical Exam Performed:    /80   Pulse 102   Temp 98.1 °F (36.7 °C) (Oral)   Resp 20   Ht 5' 6\" (1.676 m)   Wt 167 lb (75.8 kg)   SpO2 95%   BMI 26.95 kg/m²     General appearance: No apparent distress, appears stated age and cooperative. Respiratory:  Crackles present b/l, no wheezing  Cardiovascular: Regular rate and rhythm with normal S1/S2 without murmurs, rubs or gallops.   Abdomen: Soft, non-tender, non-distended with normal bowel sounds. Musculoskeletal: No clubbing, cyanosis or edema bilaterally. Skin: Skin color, texture, turgor normal.  No rashes or lesions. Neurologic:  Neurovascularly intact grossly non-focal.  Psychiatric: Alert and oriented, thought content appropriate, normal insight  Capillary Refill: Brisk,3 seconds, normal   Peripheral Pulses: +2 palpable, equal bilaterally       Labs:   No results for input(s): WBC, HGB, HCT, PLT in the last 72 hours. Recent Labs     09/25/21  0525 09/26/21  0456 09/27/21  0550    141 138   K 4.3 4.2 4.0    105 102   CO2 22 25 24   BUN 28* 33* 37*   CREATININE 1.0 1.0 1.1   CALCIUM 7.8* 8.1* 8.2*   PHOS 3.5 3.9 4.0     Recent Labs     09/25/21  0525 09/26/21  0456 09/27/21  0550   AST 48* 36 29   ALT 40 38 34   BILIDIR <0.2 0.3 0.3   BILITOT 0.7 0.9 1.0   ALKPHOS 57 63 65     No results for input(s): INR in the last 72 hours. No results for input(s): Kaleen Hope in the last 72 hours. Urinalysis:      Lab Results   Component Value Date    NITRU POSITIVE 02/06/2018    WBCUA see below 02/06/2018    BACTERIA 2+ 01/15/2018    RBCUA >100 02/06/2018    BLOODU LARGE 02/06/2018    SPECGRAV <=1.005 02/06/2018    GLUCOSEU Negative 02/06/2018       Radiology:  XR CHEST PORTABLE   Final Result      1. Minimal basilar airspace disease with mild interval improvement of the right lung base. VL Extremity Venous Bilateral   Final Result      CTA PULMONARY W CONTRAST   Final Result   1. No evidence of pulmonary embolus. 2. Diffuse bilateral ground glass pulmonary opacities representing a classic appearance for COVID pneumonia. XR CHEST PORTABLE   Final Result      Coarse bilateral basilar predominant consolidation is compatible with atypical/viral pneumonia in the proper clinical context. Elevated right hemidiaphragm. Normal cardiomediastinal silhouette.       CTA PULMONARY W CONTRAST    (Results Pending)           Assessment/Plan:    Active Hospital Problems    Diagnosis     Pneumonia due to COVID-19 virus [U07.1, J12.82]         COVID 19 pneumonia with acute hypoxic resp failure:  Procal, legionella, strep AG negative  Oxygen requirements are fluctuating, up to 7 liters from 3 this am  Cont with decadron  s/p remdesivir 9/24/21  cont baricitinib    CT 9/27- worsening consolidation in RLL and cavitary nodule TOREY  Will reconsult pulmonology  Obtain procal, CBC  Will start on empiric antibiotics until seen by pulm       Elevated Ddimer:  CTA chest negative for PE updated 9/27  Venous doppler negative  Cont with lovenox prophy dose      BPH:  Monitor for urinary retention     Hyponatremia:  Improved with IVF  stopped fluid       DVT Prophylaxis: lovenox  Diet: ADULT DIET;  Regular  Code Status: Full Code    PT/OT Eval Status: on going    Dispo - [place]    Ashley Escobar MD

## 2021-09-27 NOTE — PROGRESS NOTES
Comprehensive Nutrition Assessment    RD did not conduct direct, in-person nutrition evaluation in efforts to reduce exposure and use of PPE for high risk persons, PUI persons, patients who have tested positive for Covid-19 or those awaiting respiratory panel results. EMR was screened for nutrition risk factors, as defined per nutrition standards of care. Nutrition history and assessment obtained through RD EMR review and RN report. RECOMMENDATIONS:  1. PO Diet: Continue regular diet   2. Please obtain measured wt as able    NUTRITION ASSESSMENT:   Type and Reason for Visit:  Type and Reason for Visit: Initial, RD Nutrition Re-Screen/LOS   Nutritional summary & status: Nutrition LOS eval: Noted COVID19 positive pt, RD unable to see pt in room at this time. Pt is currently on a regular diet with variable PO intakes, but mainly >50% PO intake average per meal documentation. Current admission wt is stated, need measured wt to accurately assess wt changes. Continue to encourage pt to consume adequate meal intakes. RD will monitor per [facility] through admission.     Patient admitted d/t shortness of breath    PMH significant for: BHP, s/p TRUP    MALNUTRITION ASSESSMENT  Context of Malnutrition: Acute Illness   Malnutrition Status: Insufficient data  Findings of the 6 clinical characteristics of malnutrition (Minimum of 2 out of 6 clinical characteristics is required to make the diagnosis of moderate or severe Protein Calorie Malnutrition based on AND/ASPEN Guidelines):  Energy Intake: No significant decrease in energy intake    Energy Intake Time: No significant    Weight Loss %: Unable to assess   Need current measured wt  Weight loss Time: Unable to assess   Body Fat Loss: Unable to Assess   Body Fat Location: Unable to assess    Body Muscle Loss: Unable to Assess   Body Muscle Loss Location: Unable to assess    Fluid Accumulation: No significant    Fluid Accumulation Location: No significant     Strength: Not Performed; Not Measured     NUTRITION DIAGNOSIS   No nutrition diagnosis at this time   [address] and/or Nutrient Delivery:  Continue Current Diet  Nutrition Education/Counseling:  No recommendation at this time   Goals:  pt will consistently consume >50% PO intake of meals and ONS offered through admission       Nutrition Monitoring and Evaluation:   Food/Nutrient Intake Outcomes:  Food and Nutrient Intake  Physical Signs/Symptoms Outcomes:  Biochemical Data, Weight     OBJECTIVE DATA: Significant to nutrition assessment  · Nutrition-Focused Physical Findings: Nutrition Related Findings: lbm 9/25   · Labs: Reviewed; Ca 8.2  · Meds: Reviewed; vitamin D, vitamin C, prednisone, insulin  · Wounds: Wound Type: None     CURRENT NUTRITION THERAPIES  ADULT DIET; Regular     PO Intake: Average Meal Intake: 51-75%, %   PO Supplement Intake:Average Supplements Intake: None Ordered  IVF: none    ANTHROPOMETRICS  Current Height: 5' 6\" (167.6 cm)  Current Weight: 167 lb (75.8 kg)    Admission weight: 167 lb (75.8 kg)  Ideal Body Weight (lbs) (Calculated): 142 lbs (Ideal Body Weight (Kg) (Calculated): 65 kg)  Usual Bodyweight MOUNA-no recent wt hx  Weight Changes MOUNA-current wt stated     BMI (Calculated): 27    Wt Readings from Last 50 Encounters:   09/20/21 167 lb (75.8 kg)   02/20/18 154 lb (69.9 kg)   02/07/18 150 lb 5.7 oz (68.2 kg)   01/23/18 152 lb 12.8 oz (69.3 kg)   01/15/18 189 lb 9.5 oz (86 kg)     COMPARATIVE STANDARDS  Energy (kcal):  6256-7041; Weight Used for Energy Requirements:  Current (75kg)     Protein (g):  84-98 (1.3-1.5g/kg); Weight Used for Protein Requirements:  Ideal        Fluid (ml/day):  7873-1607; Method Used for Fluid Requirements:  1 ml/kcal      The patient will still be monitored per nutrition standards of care. Consult dietitian if nutrition interventions essential to patient care is needed.      Haydee Castaneda, 67 [address]:  [phone]  Office:  [phone]

## 2021-09-27 NOTE — PLAN OF CARE
Problem: Pain:  Goal: Pain level will decrease  Description: Pain level will decrease  9/27/2021 1854 by Daphne Ortiz RN  Outcome: Ongoing  Note: Pt denied pain for the duration of this shift. Problem: Gas Exchange - Impaired  Goal: Promote optimal lung function  9/27/2021 1854 by Daphne Ortiz RN  Outcome: Ongoing  Note: Pt encouraged to cough, deep breath, and reposition to promote optimal lung expansion. Problem: Body Temperature -  Risk of, Imbalanced  Goal: Ability to maintain a body temperature within defined limits  9/27/2021 1854 by Daphne Ortiz RN  Outcome: Ongoing  Note: Pt maintained stable temperature for the duration of the shift. Problem: Nutrition Deficits  Goal: Optimize nutritional status  9/27/2021 1854 by Daphne Ortiz RN  Outcome: Ongoing  Note: Pt was encouraged to eat and taught the importance of maintaining optimal health. Problem: Patient Education: Go to Patient Education Activity  Goal: Patient/Family Education  9/27/2021 1854 by Daphne Ortiz RN  Outcome: Ongoing     Problem: Falls - Risk of:  Goal: Will remain free from falls  Description: Will remain free from falls  9/27/2021 1854 by Daphne Ortiz RN  Outcome: Ongoing  Note: Pt remained free of falls for the duration of the shift.

## 2021-09-27 NOTE — CARE COORDINATION
Case Management Assessment           Daily Note                 Date/ Time of Note: 9/27/2021 10:36 AM         Note completed by: Elías Lopez RN    Patient Name: Jennifer Conteh  YOB: 1955    Diagnosis:Pneumonia due to COVID-19 virus [U07.1, J12.82]  COVID-19 [U07.1]  Patient Admission Status: Inpatient    Date of Admission:9/20/2021  9:51 AM Length of Stay: 7 GLOS:      Current Plan of Care: increased O2 requirement (7L), IV steroids  ________________________________________________________________________________________  PT AM-PAC: 17 / 24 per last evaluation on: 09.24.2021    OT AM-PAC: 18 / 24 per last evaluation on: 09.24.2021    DME Needs for discharge: walker and home O2 (has no insurance and cannot afford private pay)  ________________________________________________________________________________________  Discharge Plan: Home with [address] [place] [address] Way: [facility] (following for [facility] visits)    Tentative discharge date: TBD    Current barriers to discharge: weaning O2, IV steroids, NO INSURANCE, medical stability and clearance for DC    Referrals completed: Not Applicable    Resources/ information provided: Not indicated at this time  ________________________________________________________________________________________  Case Management Notes: CM continues to follow for DC planning and needs. Patient had increased O2 requirement up to 7L today. Patient reports unable to afford private pay cost of $150 for home O2, continues on IV steroids and had meds to beds completed Friday 09.24.2021 in anticipation of weekend DC. Patient also needs RW at DC as well, patient has no insurance coverage. [facility] following for new DME and home O2 set up. Tatyana Rucker and his family were provided with choice of provider; he and his family are in agreement with the discharge plan.     Care Transition Patient: Padmini Lopez RN  [facility] [employer] [employer]  Case Management Department  Ph: [phone]  Fax: [phone]

## 2021-09-27 NOTE — PROGRESS NOTES
RN offered to call report to pt's family member or friend, pt denied the need for report to be called.

## 2021-09-27 NOTE — CONSULTS
Clinical Pharmacy Progress Note    Vancomycin - Management by Pharmacy    Consult Date(s): 9/27/21  Consulting Provider(s): Dr. Girish Saldivar / Plan    1) Acute respiratory failure - COVID-19 PNA, HAP: Vancomycin + Cefepime - Day #1   Concurrent Antimicrobials:   o Cefepime - Day #1   Day of Vanc Therapy: #1   Current Dosing Method: AUC   Therapeutic Goal: -600 mg/L*h   Current Dose / Frequency: 750mg IV q12h   Plan / Rationale:   o Will begin vancomycin 750 mg IV q12h -- using Bayesian kinetics, predicated AUC is 468 mg/L*h with estimated trough of 15.6 mg/L.  o Vancomycin level ordered for tomorrow AM, Tues 9/28 to confirm kinetic estimates.  Will continue to monitor clinical condition and make adjustments to regimen as appropriate. Thank you for consulting Pharmacy! Please call with questions--  Dilan Rodriguez, PharmD, BCPS  Wireless: [phone]   9/27/2021 3:04 PM      Interval update: O2 requirement increased to 7L NC overnight. CT 9/27 showed worsening consolidation in RLL and cavitary nodule in TOREY. Pulmonology re-consulted this AM. Remain on Baricitinib; completed 5-day course of remdesivir. Subjective/Objective: Mr. Shaina Dill is a 60 y.o. male with a PMHx significant for BPH s/p TURP who presented 9/20 with worsening SOB. Pt tested positive for COVID-19 at urgent care on 9/18. Admitted for COVID-19 PNA with acute hypoxic respiratory failure. Patient completed 5-day course of Remdesivir 9/24; also on Baricitinib. Pharmacy has been consulted to dose vancomycin.     Pertinent Antimicrobials:  Cefepime 2g IV q12h (9/27-current)  Vancomycin - pharmacy to dose (9/27-current)   750 mg IV q12h (9/27-current)    Height:   Ht Readings from Last 1 Encounters:   09/20/21 5' 6\" (1.676 m)     Weight:   Wt Readings from Last 1 Encounters:   09/20/21 167 lb (75.8 kg)     Level(s) / Doses:    Date Time Dose Level / Type of Level Interpretation                 Note: Serum levels collected for AUC-based dosing may be high if collected in close proximity to the dose administered. This is not necessarily an indicator of toxicity. Cultures & Sensitivities:    Date Site Micro Susceptibility / Result   9/20 Legionella antigen, urine Negative    9/20 Strep pneumo antigen Negative     MRSA nasal PCR       Labs / Ancillary Data:    Estimated Creatinine Clearance: 61 mL/min (based on SCr of 1.1 mg/dL).     Recent Labs     09/25/21  0525 09/26/21  0456 09/27/21  0550   CREATININE 1.0 1.0 1.1   BUN 28* 33* 37*

## 2021-09-27 NOTE — PLAN OF CARE
Problem: Pain:  Description: Pain management should include both nonpharmacologic and pharmacologic interventions. Goal: Pain level will decrease  Description: Pain level will decrease  Outcome: Ongoing     Pt denies pain throughout shift. Vital signs stable. Pt resting comfortably in bed continue to monitor. Problem: Gas Exchange - Impaired  Goal: Absence of hypoxia  9/27/2021 0837 by Danya Esteban RN  Outcome: Ongoing     Pt able to maintain respiration between 16-18 beats per minute, oxygen saturation greater then 90% with 3 liters supplemental oxygen. Pt sleeping comfortably in bed with liters of 02 same as home dose. Will continue to monitor. Problem: Falls - Risk of:  Goal: Will remain free from falls  Description: Will remain free from falls  Outcome: Ongoing     Pt remains without falls during this shift. Pt does not attempt to get OOB alone. Pt able to call out appropriately, call light within reach. Safety precautions in place include fall armband, fall towel, chair & bed alarms, non slip foot wear. Signs posted on door, and door remains open for observation. The bed is in lowest position, wheels are locked, and rails are up 2/4. Will continue with current care.

## 2021-09-28 PROBLEM — J18.9 CAVITARY PNEUMONIA: Status: ACTIVE | Noted: 2021-09-28

## 2021-09-28 PROBLEM — R09.02 HYPOXEMIA: Status: ACTIVE | Noted: 2021-09-28

## 2021-09-28 PROBLEM — J98.4 CAVITARY PNEUMONIA: Status: ACTIVE | Noted: 2021-09-28

## 2021-09-28 LAB
ALBUMIN SERPL-MCNC: 2.8 G/DL (ref 3.4–5)
ALP BLD-CCNC: 61 U/L (ref 40–129)
ALT SERPL-CCNC: 27 U/L (ref 10–40)
ANION GAP SERPL CALCULATED.3IONS-SCNC: 11 MMOL/L (ref 3–16)
AST SERPL-CCNC: 21 U/L (ref 15–37)
BILIRUB SERPL-MCNC: 0.8 MG/DL (ref 0–1)
BILIRUBIN DIRECT: 0.3 MG/DL (ref 0–0.3)
BILIRUBIN, INDIRECT: 0.5 MG/DL (ref 0–1)
BUN BLDV-MCNC: 33 MG/DL (ref 7–20)
CALCIUM SERPL-MCNC: 7.9 MG/DL (ref 8.3–10.6)
CHLORIDE BLD-SCNC: 101 MMOL/L (ref 99–110)
CO2: 23 MMOL/L (ref 21–32)
CREAT SERPL-MCNC: 1 MG/DL (ref 0.8–1.3)
D DIMER: 2211 NG/ML DDU (ref 0–229)
GFR AFRICAN AMERICAN: >60
GFR NON-AFRICAN AMERICAN: >60
GLUCOSE BLD-MCNC: 144 MG/DL (ref 70–99)
GLUCOSE BLD-MCNC: 148 MG/DL (ref 70–99)
GLUCOSE BLD-MCNC: 154 MG/DL (ref 70–99)
GLUCOSE BLD-MCNC: 174 MG/DL (ref 70–99)
GLUCOSE BLD-MCNC: 96 MG/DL (ref 70–99)
MRSA SCREEN RT-PCR: NORMAL
PERFORMED ON: ABNORMAL
PERFORMED ON: NORMAL
PHOSPHORUS: 3.3 MG/DL (ref 2.5–4.9)
POTASSIUM SERPL-SCNC: 4.1 MMOL/L (ref 3.5–5.1)
SODIUM BLD-SCNC: 135 MMOL/L (ref 136–145)
TOTAL PROTEIN: 6.6 G/DL (ref 6.4–8.2)

## 2021-09-28 PROCEDURE — 99233 SBSQ HOSP IP/OBS HIGH 50: CPT | Performed by: INTERNAL MEDICINE

## 2021-09-28 PROCEDURE — 2580000003 HC RX 258: Performed by: INTERNAL MEDICINE

## 2021-09-28 PROCEDURE — 6370000000 HC RX 637 (ALT 250 FOR IP): Performed by: INTERNAL MEDICINE

## 2021-09-28 PROCEDURE — 97116 GAIT TRAINING THERAPY: CPT

## 2021-09-28 PROCEDURE — 80076 HEPATIC FUNCTION PANEL: CPT

## 2021-09-28 PROCEDURE — 2060000000 HC ICU INTERMEDIATE R&B

## 2021-09-28 PROCEDURE — 80069 RENAL FUNCTION PANEL: CPT

## 2021-09-28 PROCEDURE — 6360000002 HC RX W HCPCS: Performed by: INTERNAL MEDICINE

## 2021-09-28 PROCEDURE — 36415 COLL VENOUS BLD VENIPUNCTURE: CPT

## 2021-09-28 PROCEDURE — 6360000002 HC RX W HCPCS: Performed by: STUDENT IN AN ORGANIZED HEALTH CARE EDUCATION/TRAINING PROGRAM

## 2021-09-28 PROCEDURE — 97110 THERAPEUTIC EXERCISES: CPT

## 2021-09-28 PROCEDURE — 85379 FIBRIN DEGRADATION QUANT: CPT

## 2021-09-28 RX ADMIN — ENOXAPARIN SODIUM 30 MG: 30 INJECTION SUBCUTANEOUS at 10:18

## 2021-09-28 RX ADMIN — CEFEPIME HYDROCHLORIDE 2000 MG: 2 INJECTION, POWDER, FOR SOLUTION INTRAVENOUS at 17:13

## 2021-09-28 RX ADMIN — DEXAMETHASONE SODIUM PHOSPHATE 10 MG: 4 INJECTION, SOLUTION INTRAMUSCULAR; INTRAVENOUS at 10:19

## 2021-09-28 RX ADMIN — Medication 10 ML: at 10:17

## 2021-09-28 RX ADMIN — Medication 15 ML: at 12:19

## 2021-09-28 RX ADMIN — Medication 10 ML: at 21:11

## 2021-09-28 RX ADMIN — INSULIN LISPRO 1 UNITS: 100 INJECTION, SOLUTION INTRAVENOUS; SUBCUTANEOUS at 20:57

## 2021-09-28 RX ADMIN — SODIUM CHLORIDE 25 ML: 9 INJECTION, SOLUTION INTRAVENOUS at 17:11

## 2021-09-28 RX ADMIN — SODIUM CHLORIDE 25 ML: 9 INJECTION, SOLUTION INTRAVENOUS at 23:34

## 2021-09-28 RX ADMIN — Medication 2000 UNITS: at 10:19

## 2021-09-28 RX ADMIN — OXYCODONE HYDROCHLORIDE AND ACETAMINOPHEN 500 MG: 500 TABLET ORAL at 10:19

## 2021-09-28 RX ADMIN — INSULIN LISPRO 1 UNITS: 100 INJECTION, SOLUTION INTRAVENOUS; SUBCUTANEOUS at 17:14

## 2021-09-28 RX ADMIN — CEFEPIME HYDROCHLORIDE 2000 MG: 2 INJECTION, POWDER, FOR SOLUTION INTRAVENOUS at 07:34

## 2021-09-28 RX ADMIN — GUAIFENESIN SYRUP AND DEXTROMETHORPHAN 5 ML: 100; 10 SYRUP ORAL at 20:56

## 2021-09-28 RX ADMIN — CEFEPIME HYDROCHLORIDE 2000 MG: 2 INJECTION, POWDER, FOR SOLUTION INTRAVENOUS at 23:35

## 2021-09-28 RX ADMIN — ENOXAPARIN SODIUM 30 MG: 30 INJECTION SUBCUTANEOUS at 20:56

## 2021-09-28 RX ADMIN — VANCOMYCIN HYDROCHLORIDE 750 MG: 10 INJECTION, POWDER, LYOPHILIZED, FOR SOLUTION INTRAVENOUS at 03:34

## 2021-09-28 RX ADMIN — PANTOPRAZOLE SODIUM 40 MG: 40 TABLET, DELAYED RELEASE ORAL at 07:33

## 2021-09-28 RX ADMIN — GUAIFENESIN SYRUP AND DEXTROMETHORPHAN 5 ML: 100; 10 SYRUP ORAL at 10:18

## 2021-09-28 RX ADMIN — BARICITINIB 4 MG: 2 TABLET, FILM COATED ORAL at 10:17

## 2021-09-28 RX ADMIN — Medication 100 MG: at 10:19

## 2021-09-28 ASSESSMENT — PAIN SCALES - GENERAL
PAINLEVEL_OUTOF10: 0

## 2021-09-28 NOTE — PROGRESS NOTES
Clinical Pharmacy Progress Note    Vancomycin has been discontinued. Pharmacy will sign off. Please re-consult pharmacy if Vancomycin dosing is wanted in the future. Please call with questions.   Lorenzo Stanton PharmD, BCPS  Wireless: [phone]   9/28/2021 8:10 AM

## 2021-09-28 NOTE — PROGRESS NOTES
ulcers, or subcutaneous nodules  Psychiatric: Alert and oriented to time place and person    DATA  CBC:   Recent Labs     09/27/21  1458   WBC 18.2*   HGB 14.9   HCT 44.7   MCV 94.5        BMP:   Recent Labs     09/26/21  0456 09/27/21  0550 09/28/21  0506    138 135*   K 4.2 4.0 4.1    102 101   CO2 25 24 23   PHOS 3.9 4.0 3.3   BUN 33* 37* 33*   CREATININE 1.0 1.1 1.0     No results for input(s): PHART, [id number], PO2ART in the last 72 hours. LIVER PROFILE:   Recent Labs     09/26/21  0456 09/27/21  0550 09/28/21  0506   AST 36 29 21   ALT 38 34 27   BILIDIR 0.3 0.3 0.3   BILITOT 0.9 1.0 0.8   ALKPHOS 63 65 61       Radiology Review:  Pertinent images / reports were reviewed as a part of this visit. CTA PULMONARY W CONTRAST   Final Result      1. No evidence of pulmonary embolus. 2.  Bilateral peripheral groundglass attenuation related to viral pneumonia, similar to prior chest CT. 3.  Increased consolidation in the right lower lobe representing worsening infection. 4.  New 1.5 cm cavitary nodule in the left upper lobe is also likely related to evolving viral pneumonia. XR CHEST PORTABLE   Final Result      1. Minimal basilar airspace disease with mild interval improvement of the right lung base. VL Extremity Venous Bilateral   Final Result      CTA PULMONARY W CONTRAST   Final Result   1. No evidence of pulmonary embolus. 2. Diffuse bilateral ground glass pulmonary opacities representing a classic appearance for COVID pneumonia. XR CHEST PORTABLE   Final Result      Coarse bilateral basilar predominant consolidation is compatible with atypical/viral pneumonia in the proper clinical context. Elevated right hemidiaphragm. Normal cardiomediastinal silhouette. Assessment   Chandrika Malone is a 60 y.o. male with PMH of BPH s/p TRUP who was admitted for shortness of breath.      1.  Worsening Shortness of breath  2/2 to most likely superimposed bacterial pneumonia  - Patient was presented with shortness of breath and found to have COvid pneumonia. - CT chest was done and showed increased consolidation in the right lower lobe representing worsening infection. New 1.5 cm cavitary nodule in the left upper lobe is most consistent with a secondary infection, considering particularly staph.     2. Covid pneumonia  Pt presented with SOB, fatigue, decrease in appetite. He did not receive Covid shot. - In ED, T 98.3F, RR 20, , /91, SpO2 84% on room air. Patient was started on HFNC.    - Chest x ray showed coarse bilateral basilar predominant consolidation is compatible with atypical/viral pneumonia. - Chest CT showed diffuse bilateral ground glass pulmonary opacities representing a classic appearance for COVID pneumonia.  - S/p IV Dexamethasone 20 mg daily for 5 days. Currently, on dexamethasone 10 mg for 5 days  - S/p Remdesivir  - Baricitinib 4 mg     3. Elevated D Dimer:  - CT chest negative for PE  - Doppler U/S of LE showed no evidence of deep or superficial venous thrombosis     Plan     Patient was admitted for Covid pneumonia. Recent CT chest found a cavitary lesion in the left upper lobe. This is thought to be infectious in nature as it was not present on CT chest 6 days ago. Patient was empirically started on vancomycin but as of today MRSA nares is negative and therefore vancomycin will be discontinued. Patient currently on cefepime 2 g IV every 8. In terms of patient's oxygenation, his O2 requirement was decreased to 2 L today in the room and he was satting above 92%. Plan to continue to wean patient's O2 requirement. Patient to finish out his course of IV Decadron 10 mg daily as well as baricitinib 4 mg for 14 doses.     Jim Toussaint MD, PGY- 2  09/28/21  10:58 AM    Patient will be staffed and discussed with Sanjeev Ngo MD    This note was likely completed using voice recognition technology and may contain unintended errors. Patient examined, findings as discussed with Dr. Taylor Locke. Agree with assessment and plan. Pneumonia due to Covid is improving, oxygen requirement decreasing. Continuing current treatment with Decadron and baricitinib. Cavitary pneumonia represents a likely secondary infection. Given negative MRSA swab, it is less likely due to that organism. However, staff is still high on the differential for etiology. An antibiotic that will cover that as well as other gram-positive organisms, and can be given orally for the next 3 weeks, should be our choice of treatment. It is less likely a gram-negative organism.

## 2021-09-28 NOTE — PLAN OF CARE
Problem: Gas Exchange - Impaired  Goal: Absence of hypoxia  Outcome: Ongoing  Note: Pt was able to wean to 1L O2 during the shift and had sats remain in the mid - upper 90s. Problem: Gas Exchange - Impaired  Goal: Promote optimal lung function  Outcome: Ongoing  Note: Pt encouraged to cough, deep breath, and reposition to promote optimal lung function. Problem: Breathing Pattern - Ineffective  Goal: Ability to achieve and maintain a regular respiratory rate  Outcome: Ongoing  Note: Pt encouraged to cough, deep breath, to focus on breathing and reposition to promote optimal lung function. Problem: Isolation Precautions - Risk of Spread of Infection  Goal: Prevent transmission of infection  Outcome: Ongoing     Problem: Nutrition Deficits  Goal: Optimize nutritional status  Outcome: Ongoing  Note: Pt was encouraged to eat a balanced diet to promote optimal healing/recovery. Pt had a good appetite for the duration of the shift and ate the majority of meals.

## 2021-09-28 NOTE — PROGRESS NOTES
Hospitalist Progress Note      PCP: No primary care provider on file. Date of Admission: 9/20/2021    Chief Complaint: [facility] Course: Patient is admitted with COVID 19 at the end of first week since symptoms onset (9/15).      Subjective: patient reports some improvement in his breathing. No significant phlegm. No chest pain. Tolerating PO. Medications:  Reviewed    Infusion Medications    sodium chloride 25 mL (09/27/21 9701)    dextrose       Scheduled Medications    cefepime  2,000 mg IntraVENous Q8H    dexamethasone  10 mg IntraVENous Daily    ascorbic acid  500 mg Oral Daily    baricitinib  4 mg Oral Daily    sodium chloride flush  5-40 mL IntraVENous 2 times per day    enoxaparin  30 mg SubCUTAneous BID    guaiFENesin-dextromethorphan  5 mL Oral 4x Daily    Vitamin D  2,000 Units Oral Daily    CENTRUM/CERTA-ISSA with minerals oral  15 mL Oral Daily    thiamine mononitrate  100 mg Oral Daily    insulin lispro  0-6 Units SubCUTAneous TID WC    insulin lispro  0-3 Units SubCUTAneous Nightly    pantoprazole  40 mg Oral QAM AC     PRN Meds: sodium chloride flush, sodium chloride, ondansetron **OR** ondansetron, polyethylene glycol, acetaminophen **OR** acetaminophen, sodium chloride, glucose, dextrose, glucagon (rDNA), dextrose      Intake/Output Summary (Last 24 hours) at 9/28/2021 1404  Last data filed at 9/28/2021 1220  Gross per 24 hour   Intake 320 ml   Output 925 ml   Net -605 ml       Physical Exam Performed:    /79   Pulse 86   Temp 97.6 °F (36.4 °C) (Oral)   Resp 18   Ht 5' 6\" (1.676 m)   Wt 167 lb (75.8 kg)   SpO2 94%   BMI 26.95 kg/m²     General appearance: No apparent distress, appears stated age and cooperative. Respiratory:  Crackles present b/l, no wheezing  Cardiovascular: Regular rate and rhythm with normal S1/S2 without murmurs, rubs or gallops. Abdomen: Soft, non-tender, non-distended with normal bowel sounds.   Musculoskeletal: No clubbing, cyanosis or edema bilaterally. Skin: Skin color, texture, turgor normal.  No rashes or lesions. Neurologic:  Neurovascularly intact grossly non-focal.  Psychiatric: Alert and oriented, thought content appropriate, normal insight  Capillary Refill: Brisk,3 seconds, normal   Peripheral Pulses: +2 palpable, equal bilaterally       Labs:   Recent Labs     09/27/21  1458   WBC 18.2*   HGB 14.9   HCT 44.7        Recent Labs     09/26/21  0456 09/27/21  0550 09/28/21  0506    138 135*   K 4.2 4.0 4.1    102 101   CO2 25 24 23   BUN 33* 37* 33*   CREATININE 1.0 1.1 1.0   CALCIUM 8.1* 8.2* 7.9*   PHOS 3.9 4.0 3.3     Recent Labs     09/26/21  0456 09/27/21  0550 09/28/21  0506   AST 36 29 21   ALT 38 34 27   BILIDIR 0.3 0.3 0.3   BILITOT 0.9 1.0 0.8   ALKPHOS 63 65 61     No results for input(s): INR in the last 72 hours. No results for input(s): Adonica Hoose in the last 72 hours. Urinalysis:      Lab Results   Component Value Date    NITRU POSITIVE 02/06/2018    WBCUA see below 02/06/2018    BACTERIA 2+ 01/15/2018    RBCUA >100 02/06/2018    BLOODU LARGE 02/06/2018    SPECGRAV <=1.005 02/06/2018    GLUCOSEU Negative 02/06/2018       Radiology:  CTA PULMONARY W CONTRAST   Final Result      1. No evidence of pulmonary embolus. 2.  Bilateral peripheral groundglass attenuation related to viral pneumonia, similar to prior chest CT. 3.  Increased consolidation in the right lower lobe representing worsening infection. 4.  New 1.5 cm cavitary nodule in the left upper lobe is also likely related to evolving viral pneumonia. XR CHEST PORTABLE   Final Result      1. Minimal basilar airspace disease with mild interval improvement of the right lung base. VL Extremity Venous Bilateral   Final Result      CTA PULMONARY W CONTRAST   Final Result   1. No evidence of pulmonary embolus.    2. Diffuse bilateral ground glass pulmonary opacities representing a classic appearance for COVID pneumonia. XR CHEST PORTABLE   Final Result      Coarse bilateral basilar predominant consolidation is compatible with atypical/viral pneumonia in the proper clinical context. Elevated right hemidiaphragm. Normal cardiomediastinal silhouette. Assessment/Plan:    Active Hospital Problems    Diagnosis     Cavitary pneumonia [J18.9, J98.4]     Hypoxemia [R09.02]     Pneumonia due to COVID-19 virus [U07.1, J12.82]         COVID 19 pneumonia with acute hypoxic resp failure:  Procal, legionella, strep AG negative  Oxygen requirements are fluctuating, improved today  Cont with decadron  s/p remdesivir 9/24/21  cont baricitinib    CT 9/27- worsening consolidation in RLL and cavitary nodule TOREY  Likely due to secondary bacterial pneumonia  MRSA nares negative  Cont with cefepime       Elevated Ddimer:  CTA chest negative for PE updated 9/27  Venous doppler negative  Cont with lovenox prophy dose      BPH:  Monitor for urinary retention     Hyponatremia:  Improved with IVF  stopped fluid       DVT Prophylaxis: lovenox  Diet: ADULT DIET;  Regular  Code Status: Full Code    PT/OT Eval Status: on going    Dispo - [place], eval for d/c in am    Kristen Healy MD

## 2021-09-29 VITALS
SYSTOLIC BLOOD PRESSURE: 137 MMHG | TEMPERATURE: 97.5 F | RESPIRATION RATE: 20 BRPM | BODY MASS INDEX: 26.84 KG/M2 | WEIGHT: 167 LBS | HEIGHT: 66 IN | OXYGEN SATURATION: 91 % | HEART RATE: 68 BPM | DIASTOLIC BLOOD PRESSURE: 91 MMHG

## 2021-09-29 LAB
ALBUMIN SERPL-MCNC: 2.9 G/DL (ref 3.4–5)
ALP BLD-CCNC: 62 U/L (ref 40–129)
ALT SERPL-CCNC: 31 U/L (ref 10–40)
ANION GAP SERPL CALCULATED.3IONS-SCNC: 14 MMOL/L (ref 3–16)
AST SERPL-CCNC: 23 U/L (ref 15–37)
BASOPHILS ABSOLUTE: 0 K/UL (ref 0–0.2)
BASOPHILS RELATIVE PERCENT: 0 %
BILIRUB SERPL-MCNC: 0.6 MG/DL (ref 0–1)
BILIRUBIN DIRECT: <0.2 MG/DL (ref 0–0.3)
BILIRUBIN, INDIRECT: ABNORMAL MG/DL (ref 0–1)
BUN BLDV-MCNC: 29 MG/DL (ref 7–20)
CALCIUM SERPL-MCNC: 8 MG/DL (ref 8.3–10.6)
CHLORIDE BLD-SCNC: 102 MMOL/L (ref 99–110)
CO2: 21 MMOL/L (ref 21–32)
CREAT SERPL-MCNC: 0.8 MG/DL (ref 0.8–1.3)
EOSINOPHILS ABSOLUTE: 0.1 K/UL (ref 0–0.6)
EOSINOPHILS RELATIVE PERCENT: 0.9 %
GFR AFRICAN AMERICAN: >60
GFR NON-AFRICAN AMERICAN: >60
GLUCOSE BLD-MCNC: 143 MG/DL (ref 70–99)
GLUCOSE BLD-MCNC: 97 MG/DL (ref 70–99)
HCT VFR BLD CALC: 42.6 % (ref 40.5–52.5)
HEMOGLOBIN: 14.4 G/DL (ref 13.5–17.5)
LYMPHOCYTES ABSOLUTE: 0.5 K/UL (ref 1–5.1)
LYMPHOCYTES RELATIVE PERCENT: 4 %
MCH RBC QN AUTO: 31.9 PG (ref 26–34)
MCHC RBC AUTO-ENTMCNC: 33.7 G/DL (ref 31–36)
MCV RBC AUTO: 94.6 FL (ref 80–100)
MONOCYTES ABSOLUTE: 0.8 K/UL (ref 0–1.3)
MONOCYTES RELATIVE PERCENT: 6.2 %
NEUTROPHILS ABSOLUTE: 12 K/UL (ref 1.7–7.7)
NEUTROPHILS RELATIVE PERCENT: 88.9 %
PDW BLD-RTO: 13 % (ref 12.4–15.4)
PERFORMED ON: NORMAL
PHOSPHORUS: 2.8 MG/DL (ref 2.5–4.9)
PLATELET # BLD: 386 K/UL (ref 135–450)
PMV BLD AUTO: 8 FL (ref 5–10.5)
POTASSIUM SERPL-SCNC: 3.9 MMOL/L (ref 3.5–5.1)
RBC # BLD: 4.5 M/UL (ref 4.2–5.9)
SODIUM BLD-SCNC: 137 MMOL/L (ref 136–145)
TOTAL PROTEIN: 6.7 G/DL (ref 6.4–8.2)
WBC # BLD: 13.5 K/UL (ref 4–11)

## 2021-09-29 PROCEDURE — 6370000000 HC RX 637 (ALT 250 FOR IP): Performed by: INTERNAL MEDICINE

## 2021-09-29 PROCEDURE — 6360000002 HC RX W HCPCS: Performed by: STUDENT IN AN ORGANIZED HEALTH CARE EDUCATION/TRAINING PROGRAM

## 2021-09-29 PROCEDURE — 99232 SBSQ HOSP IP/OBS MODERATE 35: CPT | Performed by: INTERNAL MEDICINE

## 2021-09-29 PROCEDURE — 80076 HEPATIC FUNCTION PANEL: CPT

## 2021-09-29 PROCEDURE — 94761 N-INVAS EAR/PLS OXIMETRY MLT: CPT

## 2021-09-29 PROCEDURE — 36415 COLL VENOUS BLD VENIPUNCTURE: CPT

## 2021-09-29 PROCEDURE — 2580000003 HC RX 258: Performed by: INTERNAL MEDICINE

## 2021-09-29 PROCEDURE — 6360000002 HC RX W HCPCS: Performed by: INTERNAL MEDICINE

## 2021-09-29 PROCEDURE — 80069 RENAL FUNCTION PANEL: CPT

## 2021-09-29 PROCEDURE — 85025 COMPLETE CBC W/AUTO DIFF WBC: CPT

## 2021-09-29 RX ORDER — DOXYCYCLINE HYCLATE 100 MG
100 TABLET ORAL 2 TIMES DAILY
Qty: 42 TABLET | Refills: 0 | Status: SHIPPED | OUTPATIENT
Start: 2021-09-29 | End: 2021-10-20

## 2021-09-29 RX ADMIN — GUAIFENESIN SYRUP AND DEXTROMETHORPHAN 5 ML: 100; 10 SYRUP ORAL at 08:36

## 2021-09-29 RX ADMIN — DEXAMETHASONE SODIUM PHOSPHATE 10 MG: 4 INJECTION, SOLUTION INTRAMUSCULAR; INTRAVENOUS at 08:36

## 2021-09-29 RX ADMIN — Medication 15 ML: at 08:37

## 2021-09-29 RX ADMIN — BARICITINIB 4 MG: 2 TABLET, FILM COATED ORAL at 08:37

## 2021-09-29 RX ADMIN — GUAIFENESIN SYRUP AND DEXTROMETHORPHAN 5 ML: 100; 10 SYRUP ORAL at 13:08

## 2021-09-29 RX ADMIN — PANTOPRAZOLE SODIUM 40 MG: 40 TABLET, DELAYED RELEASE ORAL at 05:17

## 2021-09-29 RX ADMIN — OXYCODONE HYDROCHLORIDE AND ACETAMINOPHEN 500 MG: 500 TABLET ORAL at 08:37

## 2021-09-29 RX ADMIN — ENOXAPARIN SODIUM 30 MG: 30 INJECTION SUBCUTANEOUS at 08:38

## 2021-09-29 RX ADMIN — CEFEPIME HYDROCHLORIDE 2000 MG: 2 INJECTION, POWDER, FOR SOLUTION INTRAVENOUS at 08:32

## 2021-09-29 RX ADMIN — Medication 100 MG: at 08:37

## 2021-09-29 RX ADMIN — Medication 2000 UNITS: at 08:36

## 2021-09-29 RX ADMIN — Medication 10 ML: at 08:35

## 2021-09-29 ASSESSMENT — PAIN SCALES - GENERAL
PAINLEVEL_OUTOF10: 0

## 2021-09-29 NOTE — PROGRESS NOTES
Pulmonology Progress Note  PGY-2    Admit Date: 9/20/2021  Hospital Day: 10  Diet: ADULT DIET; Regular  Code Status: Full Code       Interval history:  Patient was seen and examined this morning. Pt remained afebrile overnight. Patient is on 1L of HFNC. Pt denies having SOB. Mentions that he was able to walk around the room yesterday without having shortness of breath. As well, reports going to the bathroom this morning and did not have shortness of breath. Denies fevers, chills, chest pain, cough, nausea, vomiting, diarrhea, or constipation. Medications:   Scheduled Meds:   cefepime  2,000 mg IntraVENous Q8H    ascorbic acid  500 mg Oral Daily    baricitinib  4 mg Oral Daily    sodium chloride flush  5-40 mL IntraVENous 2 times per day    enoxaparin  30 mg SubCUTAneous BID    guaiFENesin-dextromethorphan  5 mL Oral 4x Daily    Vitamin D  2,000 Units Oral Daily    CENTRUM/CERTA-ISSA with minerals oral  15 mL Oral Daily    thiamine mononitrate  100 mg Oral Daily    insulin lispro  0-6 Units SubCUTAneous TID WC    insulin lispro  0-3 Units SubCUTAneous Nightly    pantoprazole  40 mg Oral QAM AC       Continuous Infusions:   sodium chloride Stopped (09/29/21 0035)    dextrose         PRN Meds:  sodium chloride flush, sodium chloride, ondansetron **OR** ondansetron, polyethylene glycol, acetaminophen **OR** acetaminophen, sodium chloride, glucose, dextrose, glucagon (rDNA), dextrose    Vital/I&O/Physical examination:   VS:  BP (!) 135/96   Pulse (!) 44   Temp 97.7 °F (36.5 °C) (Oral)   Resp 20   Ht 5' 6\" (1.676 m)   Wt 167 lb (75.8 kg)   SpO2 94%   BMI 26.95 kg/m²     I/O:    Intake/Output Summary (Last 24 hours) at 9/29/2021 0840  Last data filed at 9/29/2021 0423  Gross per 24 hour   Intake 1376.24 ml   Output 475 ml   Net 901.24 ml       PE:  Physical Exam  Vitals reviewed. Constitutional:       Appearance: Normal appearance. HENT:      Head: Normocephalic and atraumatic. Nose: Nose normal.      Mouth/Throat:      Mouth: Mucous membranes are moist.   Eyes:      Extraocular Movements: Extraocular movements intact. Conjunctiva/sclera: Conjunctivae normal.      Pupils: Pupils are equal, round, and reactive to light. Cardiovascular:      Rate and Rhythm: Normal rate and regular rhythm. Pulses: Normal pulses. Heart sounds: Normal heart sounds. Pulmonary:      Effort: Pulmonary effort is normal.      Comments: Crackle on the right lung base,   Abdominal:      Palpations: Abdomen is soft. Musculoskeletal:         General: Normal range of motion. Cervical back: Normal range of motion and neck supple. Neurological:      General: No focal deficit present. Mental Status: He is alert and oriented to person, place, and time. Labs & Imaging:   LABS:  Renal:   Recent Labs     09/27/21  0550 09/28/21  0506 09/29/21  0509    135* 137   K 4.0 4.1 3.9    101 102   CO2 24 23 21   BUN 37* 33* 29*   CREATININE 1.1 1.0 0.8   GLUCOSE 108* 144* 143*   ANIONGAP 12 11 14     CBC:   Recent Labs     09/27/21  1458 09/29/21  0509   WBC 18.2* 13.5*   HGB 14.9 14.4   HCT 44.7 42.6    386   MCV 94.5 94.6                            Hepatic:   Recent Labs     09/27/21  0550 09/28/21  0506 09/29/21  0509   AST 29 21 23   ALT 34 27 31   BILITOT 1.0 0.8 0.6   ALKPHOS 65 61 62     Troponin: No results for input(s): TROPONINI in the last 72 hours. BNP: No results for input(s): BNP in the last 72 hours. Lipids: No results for input(s): CHOL, HDL in the last 72 hours. Invalid input(s): LDLCALCU, TRIGLYCERIDE  INR: No results for input(s): INR in the last 72 hours. Lactate: No results for input(s): LACTATE in the last 72 hours. ABGs:No results for input(s): PHART, YLR9IBO, PO2ART, EDX8PFC, BEART, THGBART, D8DKACCO, JUX3DZX in the last 72 hours.     UA:No results for input(s): NITRITE, COLORU, PHUR, LABCAST, WBCUA, RBCUA, MUCUS, TRICHOMONAS, YEAST, BACTERIA, CLARITYU, SPECGRAV, LEUKOCYTESUR, UROBILINOGEN, BILIRUBINUR, BLOODU, GLUCOSEU, AMORPHOUS in the last 72 hours. Invalid input(s): KETONESU     IMAGING:  CTA PULMONARY W CONTRAST   Final Result      1. No evidence of pulmonary embolus. 2.  Bilateral peripheral groundglass attenuation related to viral pneumonia, similar to prior chest CT. 3.  Increased consolidation in the right lower lobe representing worsening infection. 4.  New 1.5 cm cavitary nodule in the left upper lobe is also likely related to evolving viral pneumonia. XR CHEST PORTABLE   Final Result      1. Minimal basilar airspace disease with mild interval improvement of the right lung base. VL Extremity Venous Bilateral   Final Result      CTA PULMONARY W CONTRAST   Final Result   1. No evidence of pulmonary embolus. 2. Diffuse bilateral ground glass pulmonary opacities representing a classic appearance for COVID pneumonia. XR CHEST PORTABLE   Final Result      Coarse bilateral basilar predominant consolidation is compatible with atypical/viral pneumonia in the proper clinical context. Elevated right hemidiaphragm. Normal cardiomediastinal silhouette. Assessment & Plan:    Calvin Leon is a 60 y.o. male with PMH of BPH s/p TRUP who was admitted for shortness of breath. Worsening Shortness of breath:  - 2/2 to most likely superimposed bacterial pneumonia  - Patient was presented with shortness of breath and found to have COvid pneumonia.  - His oxygen requirement was improving. However, patient started to have worsening of his SOB. - CT chest was done and showed increased consolidation in the right lower lobe representing worsening infection. New 1.5 cm cavitary nodule in the left upper lobe is most consistent with a secondary infection, considering particularly staph.  - MRSA negative. However, staph is still high on the differential etiology on cavitary pneumonia.  Less likely gram negative organism.   - Patient can be discharged home oral doxycycline for the next three weeks      Covid pneumonia. - Pt presented with SOB, fatigue, decrease in appetite. He did not receive Covid shot. - In ED, T 98.3F, RR 20, , /91, SpO2 84% on room air. Patient was started on HFNC.    - Chest x ray showed coarse bilateral basilar predominant consolidation is compatible with atypical/viral pneumonia. - Covid test positive. - Chest CT showed diffuse bilateral ground glass pulmonary opacities representing a classic appearance for COVID pneumonia.  - S/p IV Dexamethasone 20 mg daily for 5 days. On dexamethasone 10 mg (day5 of 5). - S/p Remdesivir  - Baricitinib 4 mg (day9). Patient does not need Baricitinib at discharge.      Elevated D Dimer:  - CT chest negative for PE  - Doppler U/S of LE showed no evidence of deep or superficial venous thrombosis       Code Status: Full Code  ADULT DIET; Regular  PPX: Protonix; Lovenox       This patient will be discussed with attending, Dr. Arline Decker MD.    Erica Connelly MD, PGY- 2  Contact via [facility]  9/29/2021,  8:40 AM  Patient examined, findings as discussed with Dr. Laure Youssef. Agree with assessment and plan. He is making a good recovery from  COVID-19 pneumonia, no longer requires oxygen. He may be safely discharged home. He is advised about managing risks for exposure, and gradually increasing activity. He should get a vaccination in 3 months.   Discussed with Dr. Shante Joshi

## 2021-09-29 NOTE — PROGRESS NOTES
Discharge instructions and medications given. Verbalized understanding. Discharged home with self care.

## 2021-09-29 NOTE — CARE COORDINATION
[facility]    Spoke with patient regarding [facility] services. Patient aware and agreeable to services.  Faxed orders to [facility] for [facility] by 10/1    Yordan Jimenes LPN  Care Transition Nurse  [facility]  [phone]

## 2021-09-29 NOTE — DISCHARGE SUMMARY
Hospital Medicine Discharge Summary      Patient ID: Dede Head      Patient's PCP: No primary care provider on file. Admit Date: 9/20/2021     Discharge Date:   9/29/2021    Admitting Physician: Vee Willson MD    Discharge Physician: Vee Willson MD     Discharge Diagnoses: Active Hospital Problems    Diagnosis Date Noted    Cavitary pneumonia [J18.9, J98.4] 09/28/2021    Hypoxemia [R09.02] 09/28/2021    Pneumonia due to COVID-19 virus [U07.1, J12.82] 09/20/2021         The patient was seen and examined on day of discharge and this discharge summary is in conjunction with any daily progress note from day of discharge. Hospital Course: The patient is a 60 y.o. male with medical h/o BHP, s/p TRUP, no other known medical problems, who presented to [facility] with worsening shortness of breath. Patient was in contact with a friend who was diagnosed with COVID 23 and tested positive himself 5 days prior to admission. Hospital course:  1. COVID 19 pneumonia with acute hypoxic resp failure:  Procal, legionella, strep AG negative  Treated with decadron, high dose protocol, remdesivir, baricitinib. Discharged with short taper of steroids. Did not need O2 on discharge based on RT assessment.      2.  Secondary bacterial pneumonia, likely gram positive:  CT 9/27- worsening consolidation in RLL and cavitary nodule TOREY  Likely due to secondary bacterial pneumonia  MRSA nares negative  3 weeks doxycycline on discharge  Follow up with pulmonology in few weeks.      3. Elevated Ddimer:  CTA chest negative for PE updated 9/27  Venous doppler negative  lovenox prophy dose        Consults:     IP CONSULT TO HOSPITALIST  IP CONSULT TO PHARMACY  IP CONSULT TO PULMONOLOGY  IP CONSULT TO PULMONOLOGY  IP CONSULT TO HOME CARE NEEDS    Disposition:  Home     Discharged Condition: Stable    Code Status: Full Code    Activity: activity as tolerated    Diet: regular diet    Follow Up: Primary Care Physician in one week- referred to [facility] outpatient clinic, Pulm in few weeks. Exam:     General appearance: No apparent distress, appears stated age and cooperative. Lungs: bilateral rales  Heart: Regular rate and rhythm with Normal S1/S2 without  murmurs, rubs or gallops, point of maximum impulse non-displaced  Abdomen: Soft, non-tender or non-distended without rigidity or guarding and positive bowel sounds all four quadrants. Extremities: No clubbing, cyanosis, or edema bilaterally. Skin: Skin color, texture, turgor normal.    Neurologic: Alert and oriented X 3,  grossly non-focal.  Mental status: Alert, oriented, thought content appropriate      Labs: For convenience and continuity at follow-up the following most recent labs are provided:    CBC:   Lab Results   Component Value Date    WBC 13.5 09/29/2021    HGB 14.4 09/29/2021    HCT 42.6 09/29/2021     09/29/2021       RENAL:   Lab Results   Component Value Date     09/29/2021    K 3.9 09/29/2021    K 3.7 09/20/2021     09/29/2021    CO2 21 09/29/2021    BUN 29 09/29/2021    CREATININE 0.8 09/29/2021           Discharge Medications:   Current Discharge Medication List           Details   doxycycline hyclate (VIBRA-TABS) 100 MG tablet Take 1 tablet by mouth 2 times daily for 21 days  Qty: 42 tablet, Refills: 0      thiamine mononitrate (THIAMINE) 100 MG tablet Take 1 tablet by mouth daily  Qty: 30 tablet, Refills: 0      Vitamin D (CHOLECALCIFEROL) 50 MCG (2000 UT) TABS tablet Take 1 tablet by mouth daily  Qty: 60 tablet, Refills: 0    Comments: Labeling may look different. 25 mcg=1000 Units. Please double check dosages. ascorbic acid (V-R VITAMIN C) 250 MG tablet Take 2 tablets by mouth daily  Qty: 30 tablet, Refills: 0      predniSONE (DELTASONE) 10 MG tablet 4 tablets once daily for 3 days then 3 tablets once daily for 3 days then 2 tablets once daily for 3 days then 1 tablet once daily for 3 days.   Qty: 30 tablet, Refills: 0              Details   Multiple Vitamin (MULTIVITAMIN ADULT PO) Take 1 tablet by mouth daily                Time Spent on discharge is more than 30 minutes in the examination, evaluation, counseling and review of medications and discharge plan. Signed:  Alejandra Burrows MD   9/29/2021      Thank you No primary care provider on file. for the opportunity to be involved in this patient's care. If you have any questions or concerns please feel free to contact me at [phone].

## 2021-09-29 NOTE — PROGRESS NOTES
[facility]    Respiratory Therapy     Home Oxygen Evaluation        Name: Jos Barton  Medical Record Number: [id number]  Age: 60 y.o. Gender:  male   : 1955  Today's date: 2021  Room: [address] [place] [address]      Assessment        /83   Pulse 60   Temp 97.9 °F (36.6 °C) (Oral)   Resp 18   Ht 5' 6\" (1.676 m)   Wt 167 lb (75.8 kg)   SpO2 93%   BMI 26.95 kg/m²     Patient Active Problem List   Diagnosis    Benign prostatic hyperplasia with urinary obstruction    Pneumonia due to COVID-19 virus    Cavitary pneumonia    Hypoxemia       Social History:  Social History     Tobacco Use    Smoking status: Never Smoker    Smokeless tobacco: Never Used   Substance Use Topics    Alcohol use: No    Drug use: No       Patient Room Air saturation at rest 94%  Patient Room Air saturation upon ambulation 89 %    Oxygen saturations of 88% or less on RA qualifies patient for Home Oxygen    In your clinical assessment does the Patient Require Portable Oxygen Tanks?     No: Patient remained above 88% throughout evaluation               Patient/caregiver was educated on Home Oxygen process:  Yes      Level of patient/caregiver understanding able to:   [x] Verbalize understanding   [x] Demonstrate understanding       [] Teach back        [] Needs reinforcement        []  No available caregiver               []  Other:     Response to education:  Excellent     Time Spent with Home O2 Set Up:  10 minutes     Hunter Zhang RCP on 2021 at 11:14 AM                 .

## 2021-09-29 NOTE — CARE COORDINATION
Case Management Assessment            Discharge Note                    Date / Time of Note: 9/29/2021 1:09 PM                  Discharge Note Completed by: Camryn Srivastava RN    Patient Name: Filippo Choi   YOB: 1955  Diagnosis: Pneumonia due to COVID-19 virus [U07.1, J12.82]  COVID-19 [U07.1]   Date / Time: 9/20/2021  9:51 AM    Current PCP: No primary care provider on file. Clinic patient: Yes    Hospitalization in the last 30 days: No    Advance Directives:  Code Status: Full Code  [address] DNR form completed and on chart: Not Indicated    Financial:  Payor: /      Pharmacy:    [email or web address]  [place]  [address] [id number]  Phone: [phone] Fax: [phone]      Assistance purchasing medications?: Potential Assistance Purchasing Medications: No  Assistance provided by Case Management: [employer], pt goes to the [facility] and is able to receive his meds for 5.00 for 30 day supply. Does patient want to participate in local refill/ meds to beds program?: Yes    Meds To Beds General Rules:  1. Can ONLY be done Monday- Friday between 8:30am-5pm  2. Prescription(s) must be in pharmacy by 3pm to be filled same day  3. Copy of patient's insurance/ prescription drug card and patient face sheet must be sent along with the prescription(s)  4. Cost of Rx cannot be added to hospital bill. If financial assistance is needed, please contact unit  or ;  or  CANNOT provide pharmacy voucher for patients co-pays  5.  Patients can then  the prescription on their way out of the hospital at discharge, or pharmacy can deliver to the bedside if staff is available. (payment due at time of pick-up or delivery - cash, check, or card accepted)     Able to afford home medications/ co-pay costs: No    ADLS:  Current PT AM-PAC Score: 17 /24  Current OT AM-PAC Score: 18 /24      DISCHARGE Disposition: Home with Home Health Care: [facility]     LOC at discharge: Not Applicable  LIU Completed: Not Indicated    Notification completed in HENS/PAS?:  Not Applicable    IMM Completed:   Not Indicated    Transportation:  Transportation PLAN for discharge: Lyft   Mode of Transport: Lyft  Reason for medical transport: Not Applicable  Name of Transport Company: Not Applicable    Transport form completed: Not Indicated    Home Care:  Home Care ordered at discharge: Yes  [address]: [place]  Phone: [phone]  Fax: [phone]  Orders faxed: Yes    Durable Medical Equipment:  DME Provider: n/a  Equipment obtained during hospitalization: none    Home Oxygen and Respiratory Equipment:  Oxygen needed at discharge?: Not [address]: Not Applicable  Portable tank available for discharge?: Not Indicated    Dialysis:  Dialysis patient: No    Dialysis Center:  Not Applicable    Hospice Services:  Location: Not Applicable  Agency: Not Applicable    Consents signed: Not Indicated    Referrals made at [facility] for outpatient continued care:  Not Applicable    Additional CM Notes: Pt to discharge home with a few [address] visits through [facility] . Home O2 evaluated and none needed for discharge. Pt states he will need a lyft, CM set up for 5:30 per nursing. Pt is set up at the clinic so his meds are 5.00 and he is aware and able to pay. No further CM needs. The Plan for Transition of Care is related to the following treatment goals of Pneumonia due to COVID-19 virus [U07.1, J12.82]  COVID-19 [U07.1]    The Patient and/or patient representative Nicole Duffy and his family were provided with a choice of provider and agrees with the discharge plan Yes    Freedom of choice list was provided with basic dialogue that supports the patient's individualized plan of care/goals and shares the quality data associated with the providers.  Yes    Care Transitions patient: Padmini Briceño RN  [facility]  Case Management Department  Ph: [phone]  Fax: [phone]

## 2021-09-29 NOTE — PLAN OF CARE
Problem: Pain:  Goal: Pain level will decrease  Description: Pain level will decrease  9/29/2021 1153 by Esvin Glasgow RN  Outcome: Ongoing  Patient has denied pain thus far this shift. Will continue to monitor comfort level and treat as needed. Problem: Airway Clearance - Ineffective  Goal: Achieve or maintain patent airway  Outcome: Ongoing  Patient's breath sounds are mostly diminished and with fine crackles in bilateral bases. Denies shortness of breath at rest. Mildly short of breath with exertion. On room air for approximately an hour. Oxygen level was 93%. Will monitor. Problem: Falls - Risk of:  Goal: Will remain free from falls  Description: Will remain free from falls  Outcome: Ongoing  Patient is alert, oriented. Ambulates with stand by assistance. Gait steady. In bed with call light in reach and bed alarm activated. Will continue to monitor for safety. 7/34/3619 [id number] by Leo Ashton RN  Outcome: Ongoing  Note: PT denies pain tonight. Pain assessed q4h and as needed.

## 2021-09-29 NOTE — PROGRESS NOTES
Pt declines shift updates at this time.     Electronically signed by Araceli Alves RN on 5/28/7285 at 6:47 AM

## 2021-09-30 ENCOUNTER — CARE COORDINATION (OUTPATIENT)
Dept: CASE MANAGEMENT | Age: 66
End: 2021-09-30

## 2021-09-30 NOTE — CARE COORDINATION
with a pulse oximeter? No Discussed and confirmed pulse oximeter discharge instructions and when to notify provider or seek emergency care. CTN spoke with patient this am for initial COVID -19 Monitoring call. Patient states he is doing okay, still having SOBE. Patient with no reports of any nausea, vomiting, fevers, chills, dizziness or lightheadedness. Patient has all medications filled and in home, no other issues or concerns. CTN encouraged patient to continue to monitor for any of the above s/s, reporting any that may present to MD immediately, rest as needed and make sure to take all medications as prescribed. CTN provided contact information. Plan for follow-up call in 5-7 days based on severity of symptoms and risk factors.     Thank Conchita Fowler RN  Care Transition Coordinator  Contact [facility]:[phone]

## 2021-10-05 ENCOUNTER — OFFICE VISIT (OUTPATIENT)
Dept: INTERNAL MEDICINE CLINIC | Age: 66
End: 2021-10-05
Payer: MEDICARE

## 2021-10-05 VITALS — WEIGHT: 160 LBS | HEIGHT: 66 IN | BODY MASS INDEX: 25.71 KG/M2

## 2021-10-05 DIAGNOSIS — J98.4 CAVITARY PNEUMONIA: Primary | ICD-10-CM

## 2021-10-05 DIAGNOSIS — J12.82 PNEUMONIA DUE TO COVID-19 VIRUS: ICD-10-CM

## 2021-10-05 DIAGNOSIS — E86.0 DEHYDRATION: ICD-10-CM

## 2021-10-05 DIAGNOSIS — J18.9 CAVITARY PNEUMONIA: Primary | ICD-10-CM

## 2021-10-05 DIAGNOSIS — U07.1 PNEUMONIA DUE TO COVID-19 VIRUS: ICD-10-CM

## 2021-10-05 PROCEDURE — 99203 OFFICE O/P NEW LOW 30 MIN: CPT | Performed by: INTERNAL MEDICINE

## 2021-10-05 SDOH — ECONOMIC STABILITY: FOOD INSECURITY: WITHIN THE PAST 12 MONTHS, YOU WORRIED THAT YOUR FOOD WOULD RUN OUT BEFORE YOU GOT MONEY TO BUY MORE.: SOMETIMES TRUE

## 2021-10-05 SDOH — ECONOMIC STABILITY: FOOD INSECURITY: WITHIN THE PAST 12 MONTHS, THE FOOD YOU BOUGHT JUST DIDN'T LAST AND YOU DIDN'T HAVE MONEY TO GET MORE.: SOMETIMES TRUE

## 2021-10-05 ASSESSMENT — ENCOUNTER SYMPTOMS
DIARRHEA: 0
SHORTNESS OF BREATH: 0
VOMITING: 0
RHINORRHEA: 0
EYE DISCHARGE: 0
WHEEZING: 0
BLOOD IN STOOL: 0
ABDOMINAL PAIN: 0
EYE PAIN: 0
SINUS PAIN: 0
CHEST TIGHTNESS: 0
COUGH: 1
SORE THROAT: 0
NAUSEA: 0
SINUS PRESSURE: 0
TROUBLE SWALLOWING: 0

## 2021-10-05 ASSESSMENT — PATIENT HEALTH QUESTIONNAIRE - PHQ9
SUM OF ALL RESPONSES TO PHQ9 QUESTIONS 1 & 2: 0
SUM OF ALL RESPONSES TO PHQ QUESTIONS 1-9: 0
2. FEELING DOWN, DEPRESSED OR HOPELESS: 0
SUM OF ALL RESPONSES TO PHQ QUESTIONS 1-9: 0
SUM OF ALL RESPONSES TO PHQ QUESTIONS 1-9: 0
1. LITTLE INTEREST OR PLEASURE IN DOING THINGS: 0

## 2021-10-05 ASSESSMENT — SOCIAL DETERMINANTS OF HEALTH (SDOH): HOW HARD IS IT FOR YOU TO PAY FOR THE VERY BASICS LIKE FOOD, HOUSING, MEDICAL CARE, AND HEATING?: NOT HARD AT ALL

## 2021-10-05 NOTE — PROGRESS NOTES
Joy Polanco (:  1955) is a 60 y.o. male,New patient, here for evaluation of the following chief complaint(s): Positive For Covid-19 (to become established, follow-up from Hospital with pneumonia , feels slightly better today)         ASSESSMENT/PLAN:  1. Cavitary pneumonia  -     Fab Anderson MD, Pulmonary, [facility]  2. Pneumonia due to COVID-19 virus  -     Fab Anderson MD, Pulmonary, [facility]  3. Dehydration-more fluids      Return if symptoms worsen or fail to improve. SUBJECTIVE/OBJECTIVE:  ER ---2021--2021--o2 98% he was in the hospital and discharged home on doxycycline and a prednisone tapering dose and he is feeling much better    Doxy after food--recommended no milk products for 2 hours around it and make sure you are eating yogurt in the middle of the day. No fever and shortness of breath is gone and some cough only and otherwise not coughing up anything and no upper respiratory symptoms. He did not smoke before this and does not drink alcohol as per patient    He had cavitary pneumonia and reminded him to schedule follow-up with Dr. Nick Leong as it was given to him on after visit summary papers and and he will set it up for end of this month and reminded him to get COVID-19 vaccine in  week. He has not had screening colonoscopy and referral has been given in 2018 also and he knows the risks. He will call when he is ready to do the colonoscopy for colon cancer risk. He has not done shingles vaccine or flu vaccine so far recommended for him to do the shingles and flu vaccine  week. Dehydration-initially he was not drinking enough fluids as his appetite was poor and his protein was low to him and recommended for him to eating more protein rich foods. URI   This is a new problem. The current episode started 1 to 4 weeks ago. The problem has been rapidly improving. There has been no fever.  Associated moist    External Ears [x] Normal  [] Abnormal -    Neck: [x] No visualized mass [] Abnormal -     Pulmonary/Chest: [x] Respiratory effort normal   [x] No visualized signs of difficulty breathing or respiratory distress        [] Abnormal -   No shortness of breath or cough during virtual visit. Musculoskeletal:   [x] Normal gait with no signs of ataxia         [x] Normal range of motion of neck        [] Abnormal -     Neurological:        [x] No Facial Asymmetry (Cranial nerve 7 motor function) (limited exam due to video visit)          [x] No gaze palsy        [] Abnormal -          Skin:        [x] No significant exanthematous lesions or discoloration noted on facial skin         [] Abnormal -            Psychiatric:       [x] Normal Affect [] Abnormal -        [x] No Hallucinations    Other pertinent observable physical exam findings:-          On this date 10/5/2021 I have spent 25 minutes reviewing previous notes, test results and face to face (virtual) with the patient discussing the diagnosis and importance of compliance with the treatment plan as well as documenting on the day of the visit. Iris Wooten, was evaluated through a synchronous (real-time) audio-video encounter. The patient (or guardian if applicable) is aware that this is a billable service. Verbal consent to proceed has been obtained within the past 12 months. The visit was conducted pursuant to the emergency declaration under the [address] [place] [address] authority and the [employer] Act. Patient identification was verified, and a caregiver was present when appropriate. The patient was located in a state where the provider was credentialed to provide care. An electronic signature was used to authenticate this note.     --Joseph Bell MD

## 2021-10-05 NOTE — PATIENT INSTRUCTIONS
1 Schedule an appointment with Ken Bartlett MD (Pulmonology) in 4 weeks (10/27/2021); follow up after pneumonia  Finish antibiotics. Make sure antibiotics after food and no milk products for 2 hours around it and yogurt in the middle of the day. Increase fluids to make sure no dehydration. Lean meats or more protein in the diet. Shingles and flu vaccine in November 1 week at pharmacy. Call when agreed to do colonoscopy screening for colon cancer. COVID-19 vaccine in January 1 week.

## 2021-10-06 ENCOUNTER — CARE COORDINATION (OUTPATIENT)
Dept: CASE MANAGEMENT | Age: 66
End: 2021-10-06

## 2021-10-06 NOTE — CARE COORDINATION
Patient contacted regarding COVID-19 diagnosis. Discussed COVID-19 related testing which was available at this time. Test results were positive. Patient informed of results, if available? Yes    Care Transition Nurse contacted the patient by telephone to perform follow-up assessment. Verified name and  with patient as identifiers. Patient has following risk factors of: no known risk factors. Symptoms reviewed with patient who verbalized the following symptoms: shortness of breath. Due to no new or worsening symptoms encounter was not routed to provider for escalation. Educated patient about risk for severe COVID-19 due to risk factors according to CDC guidelines. CTN reviewed discharge instructions, medical action plan and red flag symptoms with the patient who verbalized understanding. Discussed COVID vaccination status: No. Education provided on COVID-19 vaccination as appropriate. Discussed exposure protocols and quarantine with CDC Guidelines. Patient was given an opportunity to verbalize any questions and concerns and agrees to contact CTN or health care provider for questions related to their healthcare. Was patient discharged with a pulse oximeter? No Discussed and confirmed pulse oximeter discharge instructions and when to notify provider or seek emergency care. CTN spoke with patient this afternoon for COVID -19 Monitoring CTN follow up call. Patient states he feels he is getting better. Patient states SOB/SOBE is improving, able to do more, go further and recover a lot quicker, than before. Patient with no reports of any fever, chills, nausea, vomiting, chest pain, dizziness or cough. CTN encouraged patient to continue to monitor for any of the above s/s, as well as monitoring for any worsening SOB/SOBE, reporting to MD immediately. CTN provided contact information. Plan for follow-up call in 5-7 days based on severity of symptoms and risk factors.     Thank Shanita Huffman Juanpablo Bardales, [address] [facility] Transition Coordinator  Contact [facility]:[phone]

## 2021-10-15 ENCOUNTER — CARE COORDINATION (OUTPATIENT)
Dept: CASE MANAGEMENT | Age: 66
End: 2021-10-15

## 2021-10-15 NOTE — CARE COORDINATION
Patient contacted regarding COVID-19 diagnosis. Discussed COVID-19 related testing which was available at this time. Test results were positive. Patient informed of results, if available? Yes    Care Transition Nurse contacted the patient by telephone to perform follow-up assessment. Verified name and  with patient as identifiers. Patient has following risk factors of: no known risk factors. Symptoms reviewed with patient who verbalized the following symptoms: shortness of breath. Due to no new or worsening symptoms encounter was not routed to provider for escalation. Educated patient about risk for severe COVID-19 due to risk factors according to CDC guidelines. CTN reviewed discharge instructions, medical action plan and red flag symptoms with the patient who verbalized understanding. Discussed COVID vaccination status: No. Education provided on COVID-19 vaccination as appropriate. Discussed exposure protocols and quarantine with CDC Guidelines. Patient was given an opportunity to verbalize any questions and concerns and agrees to contact CTN or health care provider for questions related to their healthcare. Was patient discharged with a pulse oximeter? No Discussed and confirmed pulse oximeter discharge instructions and when to notify provider or seek emergency care. CTN spoke with patient this am for follow up [place] [id number] call. Patient states he is doing a lot better. Patient with some SOB with exertion still, but states he is able to do a lot more, for longer, before getting SOB. Patient also states he is able to recover himself easily. No reports of any fever, chills, nausea, vomiting, chest pain, or cough. Patient with no congestion, pain, difficulty emptying bladder, LE edema, feeling lightheaded, dizziness, and heart palpitations.   CTN instructed patient to continue to monitor for any worsening SOBE, as well as monitoring for any of the above s/s, reporting any that may present to MD immediately. No other issues or concerns, no new or changed medications at this time. CTN provided contact information. Plan for follow-up call in 5-7 days based on severity of symptoms and risk factors.     Thank Salome Toure RN  Care Transition Coordinator  Contact [facility]:[phone]

## 2021-10-20 ENCOUNTER — CARE COORDINATION (OUTPATIENT)
Dept: CASE MANAGEMENT | Age: 66
End: 2021-10-20

## 2021-10-20 NOTE — CARE COORDINATION
You Patient resolved from the Care Transitions episode on 10/20/2021  Discussed COVID-19 related testing which was available at this time. Test results were positive. Patient informed of results, if available? Yes. Patient has not had another COVID -19 test, since admission and discharge to hospital.    Patient/family has been provided the following resources and education related to COVID-19:                         Signs, symptoms and red flags related to COVID-19            [facility] exposure and quarantine guidelines            [employer] exposure contact - [phone]            Contact for their local Department of Health                 Patient currently reports that the following symptoms have improved:  no new/worsening symptoms. Patient states he is no longer having any SOB/SOBE. No other issues or concerns, no new or changed medications at this time. No further outreach scheduled with this CTN/ACM. Episode of Care resolved. Patient has this CTN/ACM contact information if future needs arise.     Thank Neyda Cho RN  Care Transition Coordinator  Contact [facility]:[phone]

## 2021-11-04 PROBLEM — E86.0 DEHYDRATION: Status: RESOLVED | Noted: 2021-10-05 | Resolved: 2021-11-04

## 2021-11-10 ENCOUNTER — OFFICE VISIT (OUTPATIENT)
Dept: PULMONOLOGY | Age: 66
End: 2021-11-10
Payer: MEDICARE

## 2021-11-10 VITALS
DIASTOLIC BLOOD PRESSURE: 90 MMHG | TEMPERATURE: 96.5 F | OXYGEN SATURATION: 100 % | HEIGHT: 66 IN | BODY MASS INDEX: 24.59 KG/M2 | HEART RATE: 75 BPM | WEIGHT: 153 LBS | SYSTOLIC BLOOD PRESSURE: 155 MMHG

## 2021-11-10 DIAGNOSIS — J98.4 CAVITARY PNEUMONIA: Primary | ICD-10-CM

## 2021-11-10 DIAGNOSIS — J18.9 CAVITARY PNEUMONIA: Primary | ICD-10-CM

## 2021-11-10 DIAGNOSIS — J12.82 PNEUMONIA DUE TO COVID-19 VIRUS: ICD-10-CM

## 2021-11-10 DIAGNOSIS — U07.1 PNEUMONIA DUE TO COVID-19 VIRUS: ICD-10-CM

## 2021-11-10 PROBLEM — R09.02 HYPOXEMIA: Status: RESOLVED | Noted: 2021-09-28 | Resolved: 2021-11-10

## 2021-11-10 PROCEDURE — 99213 OFFICE O/P EST LOW 20 MIN: CPT | Performed by: INTERNAL MEDICINE

## 2021-11-10 NOTE — PROGRESS NOTES
Olu Mclean (:  1955) is a 73 y.o. male,Established patient, here for evaluation of the following chief complaint(s):  Follow-Up from Hospital (Covid 19)         ASSESSMENT/PLAN:  1. Cavitary pneumonia  -     CT CHEST WO CONTRAST; Future  2. Pneumonia due to COVID-19 virus  Clinically, lower respiratory infection with Covid and cavitary pneumonia has resolved. Etiology of cavitary pneumonia not determined, but he has completed antibiotic therapy with doxycycline. It is important to look for radiographic clearing of cavitary pneumonia. Obtaining CT chest.  We will follow-up by phone on this. Return if symptoms worsen or fail to improve. Subjective   SUBJECTIVE/OBJECTIVE:  HPI   Mr Estrellita Rodriguez is seen about 6 weeks post hospitalization for Covid pneumonia. During that time he was also found to have a cavitary lesion, felt to be infectious. He was treated with doxycycline and has completed that antibiotic. He has recovered from his pneumonia, and is feeling quite well. He has no shortness of breath, chest tightness. He rarely has any cough. His energy level is good and he is back to normal activities, which may include grass cutting end of the yard work. Appetite is good, no GI complaints. Note he is a lifelong non-smoker       Objective   Physical Exam  Constitutional:       Appearance: Normal appearance. He is well-developed and normal weight. HENT:      Head: Normocephalic and atraumatic. Mouth/Throat:      Mouth: Mucous membranes are moist.      Pharynx: Oropharynx is clear. No oropharyngeal exudate. Eyes:      General: No scleral icterus. Right eye: No discharge. Conjunctiva/sclera: Conjunctivae normal.   Neck:      Thyroid: No thyromegaly. Trachea: No tracheal deviation. Cardiovascular:      Rate and Rhythm: Normal rate and regular rhythm. Pulses:           Radial pulses are 2+ on the right side and 2+ on the left side.       Heart sounds: Normal heart sounds. No murmur heard. Pulmonary:      Effort: Pulmonary effort is normal. No respiratory distress. Breath sounds: Normal breath sounds. No stridor. No wheezing, rhonchi or rales. Chest:      Chest wall: No tenderness. Musculoskeletal:      Right lower leg: No edema. Left lower leg: No edema. Lymphadenopathy:      Cervical: No cervical adenopathy. Skin:     General: Skin is warm and dry. Neurological:      Mental Status: He is alert and oriented to person, place, and time. Psychiatric:         Mood and Affect: Mood normal.         Behavior: Behavior normal.         Thought Content: Thought content normal.         Judgment: Judgment normal.            On this date 11/10/2021 I have spent 26 minutes reviewing previous notes, test results and face to face with the patient discussing the diagnosis and importance of compliance with the treatment plan as well as documenting on the day of the visit. An electronic signature was used to authenticate this note.     --Shannan Morse MD

## 2021-11-26 ENCOUNTER — HOSPITAL ENCOUNTER (OUTPATIENT)
Dept: CT IMAGING | Age: 66
Discharge: HOME | End: 2021-11-26

## 2021-11-26 DIAGNOSIS — J18.9 CAVITARY PNEUMONIA: ICD-10-CM

## 2021-11-26 DIAGNOSIS — J98.4 CAVITARY PNEUMONIA: ICD-10-CM

## 2021-11-26 PROCEDURE — 71250 CT THORAX DX C-: CPT

## 2021-12-13 ENCOUNTER — TELEPHONE (OUTPATIENT)
Dept: PULMONOLOGY | Age: 66
End: 2021-12-13

## 2021-12-13 NOTE — TELEPHONE ENCOUNTER
Advised Mr Abhi Rosas that his chest CT on 11/26/21 shows marked improvement of diffuse pneumonia infiltrates (covid), and clearing of the cavitary pneumonia in the TORE[place]. He continues to feel well. No further follow-up is indicated.